# Patient Record
Sex: FEMALE | Race: WHITE | NOT HISPANIC OR LATINO | Employment: FULL TIME | URBAN - METROPOLITAN AREA
[De-identification: names, ages, dates, MRNs, and addresses within clinical notes are randomized per-mention and may not be internally consistent; named-entity substitution may affect disease eponyms.]

---

## 2020-11-30 ENCOUNTER — APPOINTMENT (OUTPATIENT)
Dept: CT IMAGING | Facility: HOSPITAL | Age: 20
End: 2020-11-30

## 2020-11-30 ENCOUNTER — APPOINTMENT (OUTPATIENT)
Dept: GENERAL RADIOLOGY | Facility: HOSPITAL | Age: 20
End: 2020-11-30

## 2020-11-30 ENCOUNTER — HOSPITAL ENCOUNTER (OUTPATIENT)
Facility: HOSPITAL | Age: 20
Setting detail: OBSERVATION
Discharge: HOME | End: 2020-12-04
Attending: EMERGENCY MEDICINE | Admitting: FAMILY MEDICINE

## 2020-11-30 ENCOUNTER — APPOINTMENT (OUTPATIENT)
Dept: ULTRASOUND IMAGING | Facility: HOSPITAL | Age: 20
End: 2020-11-30

## 2020-11-30 DIAGNOSIS — D72.825 BANDEMIA: ICD-10-CM

## 2020-11-30 DIAGNOSIS — R74.8 ELEVATED LIVER ENZYMES: ICD-10-CM

## 2020-11-30 DIAGNOSIS — B34.9 SYSTEMIC VIRAL ILLNESS: Primary | ICD-10-CM

## 2020-11-30 PROBLEM — R65.10 SIRS (SYSTEMIC INFLAMMATORY RESPONSE SYNDROME) (HCC): Status: ACTIVE | Noted: 2020-11-30

## 2020-11-30 PROBLEM — R74.01 TRANSAMINITIS: Status: ACTIVE | Noted: 2020-11-30

## 2020-11-30 LAB
ALBUMIN SERPL-MCNC: 4 G/DL (ref 3.5–5.2)
ALBUMIN/GLOB SERPL: 1.2 G/DL
ALP SERPL-CCNC: 233 U/L (ref 39–117)
ALT SERPL W P-5'-P-CCNC: 211 U/L (ref 1–33)
ANION GAP SERPL CALCULATED.3IONS-SCNC: 13 MMOL/L (ref 5–15)
AST SERPL-CCNC: 179 U/L (ref 1–32)
B PARAPERT DNA SPEC QL NAA+PROBE: NOT DETECTED
B PERT DNA SPEC QL NAA+PROBE: NOT DETECTED
B-HCG UR QL: NEGATIVE
BASOPHILS # BLD MANUAL: 0.05 10*3/MM3 (ref 0–0.2)
BASOPHILS NFR BLD AUTO: 1 % (ref 0–1.5)
BILIRUB SERPL-MCNC: 0.6 MG/DL (ref 0–1.2)
BILIRUB UR QL STRIP: NEGATIVE
BUN SERPL-MCNC: 8 MG/DL (ref 6–20)
BUN/CREAT SERPL: 11 (ref 7–25)
C PNEUM DNA NPH QL NAA+NON-PROBE: NOT DETECTED
CALCIUM SPEC-SCNC: 8.6 MG/DL (ref 8.6–10.5)
CHLORIDE SERPL-SCNC: 102 MMOL/L (ref 98–107)
CLARITY UR: CLEAR
CO2 SERPL-SCNC: 19 MMOL/L (ref 22–29)
COLOR UR: YELLOW
CREAT SERPL-MCNC: 0.73 MG/DL (ref 0.57–1)
CYTOLOGIST CVX/VAG CYTO: NORMAL
D-LACTATE SERPL-SCNC: 2 MMOL/L (ref 0.5–2)
D-LACTATE SERPL-SCNC: 2.4 MMOL/L (ref 0.5–2)
DEPRECATED RDW RBC AUTO: 38.6 FL (ref 37–54)
EOSINOPHIL # BLD MANUAL: 0 10*3/MM3 (ref 0–0.4)
EOSINOPHIL NFR BLD MANUAL: 0 % (ref 0.3–6.2)
ERYTHROCYTE [DISTWIDTH] IN BLOOD BY AUTOMATED COUNT: 12.7 % (ref 12.3–15.4)
FLUAV H1 2009 PAND RNA NPH QL NAA+PROBE: NOT DETECTED
FLUAV H1 HA GENE NPH QL NAA+PROBE: NOT DETECTED
FLUAV H3 RNA NPH QL NAA+PROBE: NOT DETECTED
FLUAV SUBTYP SPEC NAA+PROBE: NOT DETECTED
FLUBV RNA ISLT QL NAA+PROBE: NOT DETECTED
GFR SERPL CREATININE-BSD FRML MDRD: 100 ML/MIN/1.73
GLOBULIN UR ELPH-MCNC: 3.3 GM/DL
GLUCOSE SERPL-MCNC: 111 MG/DL (ref 65–99)
GLUCOSE UR STRIP-MCNC: NEGATIVE MG/DL
HADV DNA SPEC NAA+PROBE: NOT DETECTED
HAV IGM SERPL QL IA: NORMAL
HBV CORE IGM SERPL QL IA: NORMAL
HBV SURFACE AG SERPL QL IA: NORMAL
HCOV 229E RNA SPEC QL NAA+PROBE: NOT DETECTED
HCOV HKU1 RNA SPEC QL NAA+PROBE: NOT DETECTED
HCOV NL63 RNA SPEC QL NAA+PROBE: NOT DETECTED
HCOV OC43 RNA SPEC QL NAA+PROBE: NOT DETECTED
HCT VFR BLD AUTO: 35.9 % (ref 34–46.6)
HCV AB SER DONR QL: NORMAL
HETEROPH AB SER QL LA: NEGATIVE
HGB BLD-MCNC: 12 G/DL (ref 12–15.9)
HGB UR QL STRIP.AUTO: NEGATIVE
HIV1+2 AB SER QL: NORMAL
HMPV RNA NPH QL NAA+NON-PROBE: NOT DETECTED
HOLD SPECIMEN: NORMAL
HPIV1 RNA SPEC QL NAA+PROBE: NOT DETECTED
HPIV2 RNA SPEC QL NAA+PROBE: NOT DETECTED
HPIV3 RNA NPH QL NAA+PROBE: NOT DETECTED
HPIV4 P GENE NPH QL NAA+PROBE: NOT DETECTED
KETONES UR QL STRIP: ABNORMAL
LARGE PLATELETS: ABNORMAL
LEUKOCYTE ESTERASE UR QL STRIP.AUTO: NEGATIVE
LYMPHOCYTES # BLD MANUAL: 1.7 10*3/MM3 (ref 0.7–3.1)
LYMPHOCYTES NFR BLD MANUAL: 34 % (ref 19.6–45.3)
LYMPHOCYTES NFR BLD MANUAL: 4 % (ref 5–12)
M PNEUMO IGG SER IA-ACNC: NOT DETECTED
MCH RBC QN AUTO: 27.7 PG (ref 26.6–33)
MCHC RBC AUTO-ENTMCNC: 33.4 G/DL (ref 31.5–35.7)
MCV RBC AUTO: 82.9 FL (ref 79–97)
METAMYELOCYTES NFR BLD MANUAL: 1 % (ref 0–0)
MONOCYTES # BLD AUTO: 0.2 10*3/MM3 (ref 0.1–0.9)
NEUTROPHILS # BLD AUTO: 1.55 10*3/MM3 (ref 1.7–7)
NEUTROPHILS NFR BLD MANUAL: 13 % (ref 42.7–76)
NEUTS BAND NFR BLD MANUAL: 18 % (ref 0–5)
NITRITE UR QL STRIP: NEGATIVE
PATH INTERP BLD-IMP: NORMAL
PH UR STRIP.AUTO: 5.5 [PH] (ref 5–8)
PLATELET # BLD AUTO: 133 10*3/MM3 (ref 140–450)
PMV BLD AUTO: 11.8 FL (ref 6–12)
POLYCHROMASIA BLD QL SMEAR: ABNORMAL
POTASSIUM SERPL-SCNC: 3.4 MMOL/L (ref 3.5–5.2)
PROT SERPL-MCNC: 7.3 G/DL (ref 6–8.5)
PROT UR QL STRIP: NEGATIVE
RBC # BLD AUTO: 4.33 10*6/MM3 (ref 3.77–5.28)
RHINOVIRUS RNA SPEC NAA+PROBE: NOT DETECTED
RSV RNA NPH QL NAA+NON-PROBE: NOT DETECTED
SARS-COV-2 RNA NPH QL NAA+NON-PROBE: NOT DETECTED
SARS-COV-2 RNA RESP QL NAA+PROBE: NOT DETECTED
SODIUM SERPL-SCNC: 134 MMOL/L (ref 136–145)
SP GR UR STRIP: 1.01 (ref 1–1.03)
UROBILINOGEN UR QL STRIP: ABNORMAL
VARIANT LYMPHS NFR BLD MANUAL: 29 % (ref 0–5)
WBC # BLD AUTO: 5.01 10*3/MM3 (ref 3.4–10.8)
WBC MORPH BLD: NORMAL
WHOLE BLOOD HOLD SPECIMEN: NORMAL
WHOLE BLOOD HOLD SPECIMEN: NORMAL

## 2020-11-30 PROCEDURE — 85025 COMPLETE CBC W/AUTO DIFF WBC: CPT | Performed by: EMERGENCY MEDICINE

## 2020-11-30 PROCEDURE — 25010000002 PIPERACILLIN SOD-TAZOBACTAM PER 1 G: Performed by: INTERNAL MEDICINE

## 2020-11-30 PROCEDURE — 96375 TX/PRO/DX INJ NEW DRUG ADDON: CPT

## 2020-11-30 PROCEDURE — 86308 HETEROPHILE ANTIBODY SCREEN: CPT | Performed by: NURSE PRACTITIONER

## 2020-11-30 PROCEDURE — U0004 COV-19 TEST NON-CDC HGH THRU: HCPCS | Performed by: NURSE PRACTITIONER

## 2020-11-30 PROCEDURE — 81003 URINALYSIS AUTO W/O SCOPE: CPT | Performed by: NURSE PRACTITIONER

## 2020-11-30 PROCEDURE — 86644 CMV ANTIBODY: CPT | Performed by: INTERNAL MEDICINE

## 2020-11-30 PROCEDURE — 96365 THER/PROPH/DIAG IV INF INIT: CPT

## 2020-11-30 PROCEDURE — 99285 EMERGENCY DEPT VISIT HI MDM: CPT

## 2020-11-30 PROCEDURE — 83605 ASSAY OF LACTIC ACID: CPT | Performed by: INTERNAL MEDICINE

## 2020-11-30 PROCEDURE — 0202U NFCT DS 22 TRGT SARS-COV-2: CPT | Performed by: NURSE PRACTITIONER

## 2020-11-30 PROCEDURE — 87040 BLOOD CULTURE FOR BACTERIA: CPT | Performed by: EMERGENCY MEDICINE

## 2020-11-30 PROCEDURE — 80053 COMPREHEN METABOLIC PANEL: CPT | Performed by: EMERGENCY MEDICINE

## 2020-11-30 PROCEDURE — 86645 CMV ANTIBODY IGM: CPT | Performed by: INTERNAL MEDICINE

## 2020-11-30 PROCEDURE — 83605 ASSAY OF LACTIC ACID: CPT | Performed by: EMERGENCY MEDICINE

## 2020-11-30 PROCEDURE — G0378 HOSPITAL OBSERVATION PER HR: HCPCS

## 2020-11-30 PROCEDURE — 25010000002 IOPAMIDOL 61 % SOLUTION: Performed by: EMERGENCY MEDICINE

## 2020-11-30 PROCEDURE — 80074 ACUTE HEPATITIS PANEL: CPT | Performed by: NURSE PRACTITIONER

## 2020-11-30 PROCEDURE — 96361 HYDRATE IV INFUSION ADD-ON: CPT

## 2020-11-30 PROCEDURE — 99220 PR INITIAL OBSERVATION CARE/DAY 70 MINUTES: CPT | Performed by: INTERNAL MEDICINE

## 2020-11-30 PROCEDURE — 71045 X-RAY EXAM CHEST 1 VIEW: CPT

## 2020-11-30 PROCEDURE — G0432 EIA HIV-1/HIV-2 SCREEN: HCPCS | Performed by: INTERNAL MEDICINE

## 2020-11-30 PROCEDURE — 87040 BLOOD CULTURE FOR BACTERIA: CPT | Performed by: INTERNAL MEDICINE

## 2020-11-30 PROCEDURE — 74177 CT ABD & PELVIS W/CONTRAST: CPT

## 2020-11-30 PROCEDURE — 25010000002 KETOROLAC TROMETHAMINE PER 15 MG: Performed by: NURSE PRACTITIONER

## 2020-11-30 PROCEDURE — 96367 TX/PROPH/DG ADDL SEQ IV INF: CPT

## 2020-11-30 PROCEDURE — 76705 ECHO EXAM OF ABDOMEN: CPT

## 2020-11-30 PROCEDURE — 25010000002 HYDROMORPHONE PER 4 MG: Performed by: EMERGENCY MEDICINE

## 2020-11-30 PROCEDURE — 86665 EPSTEIN-BARR CAPSID VCA: CPT | Performed by: INTERNAL MEDICINE

## 2020-11-30 PROCEDURE — 96366 THER/PROPH/DIAG IV INF ADDON: CPT

## 2020-11-30 PROCEDURE — 87899 AGENT NOS ASSAY W/OPTIC: CPT | Performed by: INTERNAL MEDICINE

## 2020-11-30 PROCEDURE — 81025 URINE PREGNANCY TEST: CPT | Performed by: EMERGENCY MEDICINE

## 2020-11-30 PROCEDURE — 25010000002 ONDANSETRON PER 1 MG: Performed by: EMERGENCY MEDICINE

## 2020-11-30 PROCEDURE — 25010000002 VANCOMYCIN 10 G RECONSTITUTED SOLUTION: Performed by: INTERNAL MEDICINE

## 2020-11-30 PROCEDURE — C9803 HOPD COVID-19 SPEC COLLECT: HCPCS

## 2020-11-30 PROCEDURE — 85060 BLOOD SMEAR INTERPRETATION: CPT | Performed by: EMERGENCY MEDICINE

## 2020-11-30 PROCEDURE — 85007 BL SMEAR W/DIFF WBC COUNT: CPT | Performed by: EMERGENCY MEDICINE

## 2020-11-30 PROCEDURE — 25010000002 DIPHENHYDRAMINE PER 50 MG: Performed by: INTERNAL MEDICINE

## 2020-11-30 PROCEDURE — 86664 EPSTEIN-BARR NUCLEAR ANTIGEN: CPT | Performed by: INTERNAL MEDICINE

## 2020-11-30 RX ORDER — VANCOMYCIN HYDROCHLORIDE 1 G/200ML
1000 INJECTION, SOLUTION INTRAVENOUS EVERY 8 HOURS
Status: DISCONTINUED | OUTPATIENT
Start: 2020-12-01 | End: 2020-11-30

## 2020-11-30 RX ORDER — DIPHENHYDRAMINE HYDROCHLORIDE 50 MG/ML
50 INJECTION INTRAMUSCULAR; INTRAVENOUS ONCE
Status: COMPLETED | OUTPATIENT
Start: 2020-11-30 | End: 2020-11-30

## 2020-11-30 RX ORDER — VANCOMYCIN HYDROCHLORIDE 1 G/200ML
1000 INJECTION, SOLUTION INTRAVENOUS EVERY 8 HOURS
Status: DISCONTINUED | OUTPATIENT
Start: 2020-12-01 | End: 2020-12-01

## 2020-11-30 RX ORDER — FAMOTIDINE 10 MG/ML
20 INJECTION, SOLUTION INTRAVENOUS EVERY 12 HOURS SCHEDULED
Status: DISCONTINUED | OUTPATIENT
Start: 2020-11-30 | End: 2020-12-03

## 2020-11-30 RX ORDER — ONDANSETRON 2 MG/ML
4 INJECTION INTRAMUSCULAR; INTRAVENOUS ONCE
Status: COMPLETED | OUTPATIENT
Start: 2020-11-30 | End: 2020-11-30

## 2020-11-30 RX ORDER — IBUPROFEN 600 MG/1
600 TABLET ORAL ONCE
Status: COMPLETED | OUTPATIENT
Start: 2020-11-30 | End: 2020-11-30

## 2020-11-30 RX ORDER — ACETAMINOPHEN 500 MG
1000 TABLET ORAL ONCE
Status: COMPLETED | OUTPATIENT
Start: 2020-11-30 | End: 2020-11-30

## 2020-11-30 RX ORDER — KETOROLAC TROMETHAMINE 30 MG/ML
30 INJECTION, SOLUTION INTRAMUSCULAR; INTRAVENOUS ONCE
Status: COMPLETED | OUTPATIENT
Start: 2020-11-30 | End: 2020-11-30

## 2020-11-30 RX ORDER — HYDROMORPHONE HYDROCHLORIDE 1 MG/ML
0.5 INJECTION, SOLUTION INTRAMUSCULAR; INTRAVENOUS; SUBCUTANEOUS ONCE
Status: COMPLETED | OUTPATIENT
Start: 2020-11-30 | End: 2020-11-30

## 2020-11-30 RX ORDER — MULTIPLE VITAMINS W/ MINERALS TAB 9MG-400MCG
1 TAB ORAL DAILY
COMMUNITY

## 2020-11-30 RX ORDER — IBUPROFEN 400 MG/1
200 TABLET ORAL ONCE
Status: COMPLETED | OUTPATIENT
Start: 2020-11-30 | End: 2020-11-30

## 2020-11-30 RX ORDER — SODIUM CHLORIDE 0.9 % (FLUSH) 0.9 %
10 SYRINGE (ML) INJECTION AS NEEDED
Status: DISCONTINUED | OUTPATIENT
Start: 2020-11-30 | End: 2020-12-04 | Stop reason: HOSPADM

## 2020-11-30 RX ADMIN — SODIUM CHLORIDE, PRESERVATIVE FREE 10 ML: 5 INJECTION INTRAVENOUS at 19:19

## 2020-11-30 RX ADMIN — IBUPROFEN 600 MG: 600 TABLET, FILM COATED ORAL at 12:47

## 2020-11-30 RX ADMIN — ONDANSETRON 4 MG: 2 INJECTION INTRAMUSCULAR; INTRAVENOUS at 11:38

## 2020-11-30 RX ADMIN — SODIUM CHLORIDE 1000 ML: 9 INJECTION, SOLUTION INTRAVENOUS at 11:37

## 2020-11-30 RX ADMIN — DIPHENHYDRAMINE HYDROCHLORIDE 50 MG: 50 INJECTION INTRAMUSCULAR; INTRAVENOUS at 23:50

## 2020-11-30 RX ADMIN — VANCOMYCIN HYDROCHLORIDE 2250 MG: 100 INJECTION, POWDER, LYOPHILIZED, FOR SOLUTION INTRAVENOUS at 21:30

## 2020-11-30 RX ADMIN — IOPAMIDOL 80 ML: 612 INJECTION, SOLUTION INTRAVENOUS at 17:58

## 2020-11-30 RX ADMIN — SODIUM CHLORIDE, PRESERVATIVE FREE 10 ML: 5 INJECTION INTRAVENOUS at 11:30

## 2020-11-30 RX ADMIN — SODIUM CHLORIDE 1000 ML: 9 INJECTION, SOLUTION INTRAVENOUS at 16:37

## 2020-11-30 RX ADMIN — HYDROMORPHONE HYDROCHLORIDE 0.5 MG: 1 INJECTION, SOLUTION INTRAMUSCULAR; INTRAVENOUS; SUBCUTANEOUS at 12:51

## 2020-11-30 RX ADMIN — FAMOTIDINE 20 MG: 10 INJECTION INTRAVENOUS at 23:52

## 2020-11-30 RX ADMIN — ACETAMINOPHEN 1000 MG: 500 TABLET, FILM COATED ORAL at 19:18

## 2020-11-30 RX ADMIN — IBUPROFEN 200 MG: 400 TABLET ORAL at 23:54

## 2020-11-30 RX ADMIN — KETOROLAC TROMETHAMINE 30 MG: 30 INJECTION, SOLUTION INTRAMUSCULAR at 19:18

## 2020-11-30 RX ADMIN — TAZOBACTAM SODIUM AND PIPERACILLIN SODIUM 3.38 G: 375; 3 INJECTION, SOLUTION INTRAVENOUS at 20:30

## 2020-11-30 RX ADMIN — SODIUM CHLORIDE 1000 ML: 9 INJECTION, SOLUTION INTRAVENOUS at 12:47

## 2020-12-01 LAB
ANION GAP SERPL CALCULATED.3IONS-SCNC: 12 MMOL/L (ref 5–15)
BASOPHILS # BLD MANUAL: 0 10*3/MM3 (ref 0–0.2)
BASOPHILS NFR BLD AUTO: 0 % (ref 0–1.5)
BUN SERPL-MCNC: 5 MG/DL (ref 6–20)
BUN/CREAT SERPL: 7.2 (ref 7–25)
CALCIUM SPEC-SCNC: 7.9 MG/DL (ref 8.6–10.5)
CHLORIDE SERPL-SCNC: 105 MMOL/L (ref 98–107)
CO2 SERPL-SCNC: 20 MMOL/L (ref 22–29)
CREAT SERPL-MCNC: 0.69 MG/DL (ref 0.57–1)
D-LACTATE SERPL-SCNC: 1.9 MMOL/L (ref 0.5–2)
DEPRECATED RDW RBC AUTO: 39.5 FL (ref 37–54)
EOSINOPHIL # BLD MANUAL: 0.04 10*3/MM3 (ref 0–0.4)
EOSINOPHIL NFR BLD MANUAL: 1 % (ref 0.3–6.2)
ERYTHROCYTE [DISTWIDTH] IN BLOOD BY AUTOMATED COUNT: 12.8 % (ref 12.3–15.4)
GFR SERPL CREATININE-BSD FRML MDRD: 106 ML/MIN/1.73
GLUCOSE SERPL-MCNC: 112 MG/DL (ref 65–99)
HCT VFR BLD AUTO: 30.7 % (ref 34–46.6)
HGB BLD-MCNC: 10.1 G/DL (ref 12–15.9)
L PNEUMO1 AG UR QL IA: NEGATIVE
LACTATE HOLD SPECIMEN: NORMAL
LARGE PLATELETS: ABNORMAL
LYMPHOCYTES # BLD MANUAL: 0.81 10*3/MM3 (ref 0.7–3.1)
LYMPHOCYTES NFR BLD MANUAL: 22 % (ref 19.6–45.3)
LYMPHOCYTES NFR BLD MANUAL: 7 % (ref 5–12)
MAGNESIUM SERPL-MCNC: 1.6 MG/DL (ref 1.6–2.6)
MCH RBC QN AUTO: 27.7 PG (ref 26.6–33)
MCHC RBC AUTO-ENTMCNC: 32.9 G/DL (ref 31.5–35.7)
MCV RBC AUTO: 84.3 FL (ref 79–97)
MONOCYTES # BLD AUTO: 0.26 10*3/MM3 (ref 0.1–0.9)
NEUTROPHILS # BLD AUTO: 1.39 10*3/MM3 (ref 1.7–7)
NEUTROPHILS NFR BLD MANUAL: 12 % (ref 42.7–76)
NEUTS BAND NFR BLD MANUAL: 26 % (ref 0–5)
PLATELET # BLD AUTO: 114 10*3/MM3 (ref 140–450)
PMV BLD AUTO: 12.4 FL (ref 6–12)
POTASSIUM SERPL-SCNC: 3.1 MMOL/L (ref 3.5–5.2)
RBC # BLD AUTO: 3.64 10*6/MM3 (ref 3.77–5.28)
RBC MORPH BLD: NORMAL
SMUDGE CELLS BLD QL SMEAR: ABNORMAL
SODIUM SERPL-SCNC: 137 MMOL/L (ref 136–145)
VARIANT LYMPHS NFR BLD MANUAL: 32 % (ref 0–5)
WBC # BLD AUTO: 3.66 10*3/MM3 (ref 3.4–10.8)

## 2020-12-01 PROCEDURE — 96376 TX/PRO/DX INJ SAME DRUG ADON: CPT

## 2020-12-01 PROCEDURE — G0378 HOSPITAL OBSERVATION PER HR: HCPCS

## 2020-12-01 PROCEDURE — 99225 PR SBSQ OBSERVATION CARE/DAY 25 MINUTES: CPT | Performed by: INTERNAL MEDICINE

## 2020-12-01 PROCEDURE — 83735 ASSAY OF MAGNESIUM: CPT | Performed by: INTERNAL MEDICINE

## 2020-12-01 PROCEDURE — 80048 BASIC METABOLIC PNL TOTAL CA: CPT | Performed by: INTERNAL MEDICINE

## 2020-12-01 PROCEDURE — 96367 TX/PROPH/DG ADDL SEQ IV INF: CPT

## 2020-12-01 PROCEDURE — 25010000002 ONDANSETRON PER 1 MG: Performed by: INTERNAL MEDICINE

## 2020-12-01 PROCEDURE — 83605 ASSAY OF LACTIC ACID: CPT | Performed by: INTERNAL MEDICINE

## 2020-12-01 PROCEDURE — 96366 THER/PROPH/DIAG IV INF ADDON: CPT

## 2020-12-01 PROCEDURE — 96368 THER/DIAG CONCURRENT INF: CPT

## 2020-12-01 PROCEDURE — 86665 EPSTEIN-BARR CAPSID VCA: CPT | Performed by: INTERNAL MEDICINE

## 2020-12-01 PROCEDURE — 85007 BL SMEAR W/DIFF WBC COUNT: CPT | Performed by: INTERNAL MEDICINE

## 2020-12-01 PROCEDURE — 96361 HYDRATE IV INFUSION ADD-ON: CPT

## 2020-12-01 PROCEDURE — 85025 COMPLETE CBC W/AUTO DIFF WBC: CPT | Performed by: INTERNAL MEDICINE

## 2020-12-01 PROCEDURE — 25010000003 MAGNESIUM SULFATE 4 GM/100ML SOLUTION: Performed by: INTERNAL MEDICINE

## 2020-12-01 PROCEDURE — 25010000002 PIPERACILLIN SOD-TAZOBACTAM PER 1 G: Performed by: INTERNAL MEDICINE

## 2020-12-01 PROCEDURE — 25010000002 VANCOMYCIN PER 500 MG

## 2020-12-01 RX ORDER — ACETAMINOPHEN 160 MG/5ML
650 SOLUTION ORAL EVERY 4 HOURS PRN
Status: DISCONTINUED | OUTPATIENT
Start: 2020-12-01 | End: 2020-12-04 | Stop reason: HOSPADM

## 2020-12-01 RX ORDER — MAGNESIUM SULFATE HEPTAHYDRATE 40 MG/ML
2 INJECTION, SOLUTION INTRAVENOUS AS NEEDED
Status: DISCONTINUED | OUTPATIENT
Start: 2020-12-01 | End: 2020-12-04 | Stop reason: HOSPADM

## 2020-12-01 RX ORDER — POTASSIUM CHLORIDE 750 MG/1
20 CAPSULE, EXTENDED RELEASE ORAL ONCE
Status: COMPLETED | OUTPATIENT
Start: 2020-12-01 | End: 2020-12-01

## 2020-12-01 RX ORDER — POTASSIUM CHLORIDE 7.45 MG/ML
10 INJECTION INTRAVENOUS
Status: DISCONTINUED | OUTPATIENT
Start: 2020-12-01 | End: 2020-12-04 | Stop reason: HOSPADM

## 2020-12-01 RX ORDER — IBUPROFEN 600 MG/1
600 TABLET ORAL EVERY 4 HOURS PRN
Status: DISCONTINUED | OUTPATIENT
Start: 2020-12-01 | End: 2020-12-04 | Stop reason: HOSPADM

## 2020-12-01 RX ORDER — MAGNESIUM SULFATE HEPTAHYDRATE 40 MG/ML
4 INJECTION, SOLUTION INTRAVENOUS AS NEEDED
Status: DISCONTINUED | OUTPATIENT
Start: 2020-12-01 | End: 2020-12-04 | Stop reason: HOSPADM

## 2020-12-01 RX ORDER — BENZONATATE 100 MG/1
100 CAPSULE ORAL 3 TIMES DAILY PRN
Status: DISCONTINUED | OUTPATIENT
Start: 2020-12-01 | End: 2020-12-04 | Stop reason: HOSPADM

## 2020-12-01 RX ORDER — ACETAMINOPHEN 325 MG/1
650 TABLET ORAL EVERY 4 HOURS PRN
Status: DISCONTINUED | OUTPATIENT
Start: 2020-12-01 | End: 2020-12-04 | Stop reason: HOSPADM

## 2020-12-01 RX ORDER — SODIUM CHLORIDE 0.9 % (FLUSH) 0.9 %
10 SYRINGE (ML) INJECTION EVERY 12 HOURS SCHEDULED
Status: DISCONTINUED | OUTPATIENT
Start: 2020-12-01 | End: 2020-12-04 | Stop reason: HOSPADM

## 2020-12-01 RX ORDER — ACETAMINOPHEN 650 MG/1
650 SUPPOSITORY RECTAL EVERY 4 HOURS PRN
Status: DISCONTINUED | OUTPATIENT
Start: 2020-12-01 | End: 2020-12-04 | Stop reason: HOSPADM

## 2020-12-01 RX ORDER — POTASSIUM CHLORIDE 750 MG/1
40 CAPSULE, EXTENDED RELEASE ORAL AS NEEDED
Status: DISCONTINUED | OUTPATIENT
Start: 2020-12-01 | End: 2020-12-04 | Stop reason: HOSPADM

## 2020-12-01 RX ORDER — POTASSIUM CHLORIDE 1.5 G/1.77G
40 POWDER, FOR SOLUTION ORAL AS NEEDED
Status: DISCONTINUED | OUTPATIENT
Start: 2020-12-01 | End: 2020-12-04 | Stop reason: HOSPADM

## 2020-12-01 RX ORDER — SODIUM CHLORIDE, SODIUM LACTATE, POTASSIUM CHLORIDE, CALCIUM CHLORIDE 600; 310; 30; 20 MG/100ML; MG/100ML; MG/100ML; MG/100ML
100 INJECTION, SOLUTION INTRAVENOUS CONTINUOUS
Status: DISCONTINUED | OUTPATIENT
Start: 2020-12-01 | End: 2020-12-01

## 2020-12-01 RX ORDER — SODIUM CHLORIDE, SODIUM LACTATE, POTASSIUM CHLORIDE, CALCIUM CHLORIDE 600; 310; 30; 20 MG/100ML; MG/100ML; MG/100ML; MG/100ML
100 INJECTION, SOLUTION INTRAVENOUS CONTINUOUS
Status: ACTIVE | OUTPATIENT
Start: 2020-12-01 | End: 2020-12-01

## 2020-12-01 RX ORDER — SODIUM CHLORIDE 0.9 % (FLUSH) 0.9 %
10 SYRINGE (ML) INJECTION AS NEEDED
Status: DISCONTINUED | OUTPATIENT
Start: 2020-12-01 | End: 2020-12-04 | Stop reason: HOSPADM

## 2020-12-01 RX ORDER — ONDANSETRON 2 MG/ML
4 INJECTION INTRAMUSCULAR; INTRAVENOUS EVERY 6 HOURS PRN
Status: DISCONTINUED | OUTPATIENT
Start: 2020-12-01 | End: 2020-12-04 | Stop reason: HOSPADM

## 2020-12-01 RX ADMIN — ONDANSETRON 4 MG: 2 INJECTION INTRAMUSCULAR; INTRAVENOUS at 07:49

## 2020-12-01 RX ADMIN — SODIUM CHLORIDE, PRESERVATIVE FREE 10 ML: 5 INJECTION INTRAVENOUS at 20:49

## 2020-12-01 RX ADMIN — SODIUM CHLORIDE, PRESERVATIVE FREE 10 ML: 5 INJECTION INTRAVENOUS at 11:11

## 2020-12-01 RX ADMIN — IBUPROFEN 600 MG: 600 TABLET, FILM COATED ORAL at 22:43

## 2020-12-01 RX ADMIN — POTASSIUM CHLORIDE 40 MEQ: 10 CAPSULE, COATED, EXTENDED RELEASE ORAL at 14:16

## 2020-12-01 RX ADMIN — FAMOTIDINE 20 MG: 10 INJECTION INTRAVENOUS at 07:49

## 2020-12-01 RX ADMIN — BENZONATATE 100 MG: 100 CAPSULE ORAL at 21:10

## 2020-12-01 RX ADMIN — SODIUM CHLORIDE, POTASSIUM CHLORIDE, SODIUM LACTATE AND CALCIUM CHLORIDE 100 ML/HR: 600; 310; 30; 20 INJECTION, SOLUTION INTRAVENOUS at 11:18

## 2020-12-01 RX ADMIN — SODIUM CHLORIDE, POTASSIUM CHLORIDE, SODIUM LACTATE AND CALCIUM CHLORIDE 100 ML/HR: 600; 310; 30; 20 INJECTION, SOLUTION INTRAVENOUS at 01:59

## 2020-12-01 RX ADMIN — SODIUM CHLORIDE, POTASSIUM CHLORIDE, SODIUM LACTATE AND CALCIUM CHLORIDE 100 ML/HR: 600; 310; 30; 20 INJECTION, SOLUTION INTRAVENOUS at 19:43

## 2020-12-01 RX ADMIN — MAGNESIUM SULFATE HEPTAHYDRATE 4 G: 40 INJECTION, SOLUTION INTRAVENOUS at 11:17

## 2020-12-01 RX ADMIN — IBUPROFEN 600 MG: 600 TABLET, FILM COATED ORAL at 18:00

## 2020-12-01 RX ADMIN — ONDANSETRON 4 MG: 2 INJECTION INTRAMUSCULAR; INTRAVENOUS at 19:33

## 2020-12-01 RX ADMIN — ONDANSETRON 4 MG: 2 INJECTION INTRAMUSCULAR; INTRAVENOUS at 01:01

## 2020-12-01 RX ADMIN — POTASSIUM CHLORIDE 40 MEQ: 10 CAPSULE, COATED, EXTENDED RELEASE ORAL at 11:09

## 2020-12-01 RX ADMIN — ACETAMINOPHEN 650 MG: 325 TABLET, FILM COATED ORAL at 01:45

## 2020-12-01 RX ADMIN — POTASSIUM CHLORIDE 20 MEQ: 10 CAPSULE, COATED, EXTENDED RELEASE ORAL at 01:45

## 2020-12-01 RX ADMIN — ACETAMINOPHEN 650 MG: 325 TABLET, FILM COATED ORAL at 08:06

## 2020-12-01 RX ADMIN — SODIUM CHLORIDE, PRESERVATIVE FREE 10 ML: 5 INJECTION INTRAVENOUS at 07:49

## 2020-12-01 RX ADMIN — ACETAMINOPHEN 650 MG: 325 TABLET, FILM COATED ORAL at 19:41

## 2020-12-01 RX ADMIN — SODIUM CHLORIDE, PRESERVATIVE FREE 10 ML: 5 INJECTION INTRAVENOUS at 01:59

## 2020-12-01 RX ADMIN — FAMOTIDINE 20 MG: 10 INJECTION INTRAVENOUS at 20:49

## 2020-12-01 RX ADMIN — TAZOBACTAM SODIUM AND PIPERACILLIN SODIUM 3.38 G: 375; 3 INJECTION, SOLUTION INTRAVENOUS at 04:02

## 2020-12-01 RX ADMIN — ACETAMINOPHEN 650 MG: 325 TABLET, FILM COATED ORAL at 14:16

## 2020-12-01 RX ADMIN — IBUPROFEN 600 MG: 600 TABLET, FILM COATED ORAL at 11:09

## 2020-12-01 RX ADMIN — TAZOBACTAM SODIUM AND PIPERACILLIN SODIUM 3.38 G: 375; 3 INJECTION, SOLUTION INTRAVENOUS at 12:50

## 2020-12-01 RX ADMIN — VANCOMYCIN HYDROCHLORIDE 1000 MG: 1 INJECTION, SOLUTION INTRAVENOUS at 05:14

## 2020-12-01 RX ADMIN — TAZOBACTAM SODIUM AND PIPERACILLIN SODIUM 3.38 G: 375; 3 INJECTION, SOLUTION INTRAVENOUS at 20:49

## 2020-12-01 RX ADMIN — POTASSIUM CHLORIDE 40 MEQ: 10 CAPSULE, COATED, EXTENDED RELEASE ORAL at 17:51

## 2020-12-01 NOTE — PLAN OF CARE
Goal Outcome Evaluation:  Plan of Care Reviewed With: patient  Progress: improving  Outcome Summary: VSS with the exeption of fever of 103 this morning. Magnesium and Potassium are being replaced. Last dose of potassium will be given before end of shift. Pt still on room air and satting well. Will cont to monitor.

## 2020-12-01 NOTE — PROGRESS NOTES
Discharge Planning Assessment  [facility]     Patient Name: Whitney North  MRN: [id number]  Today's Date: 12/1/2020    Admit Date: 11/30/2020    Discharge Needs Assessment     Row Name 12/01/20 1005       Living Environment    Current Living Arrangements  home/apartment/condo    Primary Care Provided by  self    Provides Primary Care For  no one    Family Caregiver if Needed  parent(s)    Quality of Family Relationships  helpful;involved    Able to Return to Prior Arrangements  yes       Resource/Environmental Concerns    Resource/Environmental Concerns  none    Transportation Concerns  car, none       Transition Planning    Patient/Family Anticipates Transition to  home;home with family    Patient/Family Anticipated Services at Transition  none    Transportation Anticipated  family or friend will provide       Discharge Needs Assessment    Readmission Within the Last 30 Days  no previous admission in last 30 days    Equipment Currently Used at Home  none    Concerns to be Addressed  discharge planning    Anticipated Changes Related to Illness  none    Equipment Needed After Discharge  none        Discharge Plan     Row Name 12/01/20 1006       Plan    Plan  Initial CM eval    Patient/Family in Agreement with Plan  yes    Plan Comments  Talked with patient over the phone - she is independent of ADL's and denies the use of any DME at home. Her goal is to return home upon discharge and has her mother's assistance if needed. CM will follow for any possible discharge needs that may arise - lesly [phone]    Final Discharge Disposition Code  01 - home or self-care        Continued Care and Services - Admitted Since 11/30/2020    Coordination has not been started for this encounter.         Demographic Summary     Row Name 12/01/20 1005       General Information    Admission Type  inpatient    Arrived From  home    Referral Source  admission list    Reason for Consult  discharge planning    Preferred Language  English      Used During This Interaction  no       Contact Information    Permission Granted to Share Info With          Functional Status     Row Name 12/01/20 1005       Functional Status    Usual Activity Tolerance  good    Current Activity Tolerance  moderate       Functional Status, IADL    Medications  independent    Meal Preparation  independent    Housekeeping  independent    Laundry  independent    Shopping  independent       Mental Status    General Appearance WDL  WDL        Psychosocial    No documentation.       Abuse/Neglect    No documentation.       Legal    No documentation.       Substance Abuse    No documentation.       Patient Forms    No documentation.           Mimi Doll RN

## 2020-12-01 NOTE — H&P
[facility] Medicine Services  HISTORY AND PHYSICAL    Patient Name: Whitney North  : 2000  MRN: [id number]  Primary Care Physician: Vik Sandoval MD  Date of admission: 2020      Subjective   Subjective     Chief Complaint:  Fever, chills, myalgias    HPI:  Whitney North is a 20 y.o. female with no significant past medical history who presents to the ER with complaints of fever, chills, myalgias.    Patient reports that she had Covid in August of this year.  She recovered well from this and has had no issues.  Patient reports that starting 7 days ago she began to feel ill with flulike symptoms.  She reports persistent fever, chills, myalgias.  Over the last 2 to 3 days she has developed increasing nausea with vomiting.  Some mild left upper quadrant pain.  She also reports that she has had mild congested cough over the last 2 days with scant mucus production.  She reports some nasal congestion.  Some mild shortness of breath.  Denies any loss of taste or smell.  She works as an x-ray tech at [email or web address] and therefore is exposed to Covid regularly, but wears proper PPE..    Patient reports her last sexual contact was in May of this year.  She denies any current significant other or intimate relations since that time.  She denies prior history of mononucleosis.  She denies any exposure any sick contacts.  Denies any risky sexual behavior.  Denies any IV drug use.        Current COVID Risks are:  [x] Fever [x]  Cough [x] Shortness of breath [x] Fatigue [] Change in taste or smell    [x] Exposure to COVID positive patient  [] High risk facility   []  NONE    Review of Systems   Gen-reports fevers, chills  CV- No chest pain, palpitations  Resp-ports cough, dyspnea  GI-reports N/V and abdominal pain, denies diarrhea      All other systems reviewed and are negative.     Personal History     Past Medical History:   Diagnosis Date   • Acne    • Dysmenorrhea    • Irregular menses         Past Surgical History:   Procedure Laterality Date   • NO PAST SURGERIES         Family History: family history includes Breast cancer in an other family member; Heart disease in her maternal grandmother; Hypertension in her maternal grandmother; Osteoporosis in her paternal grandmother. Otherwise pertinent FHx was reviewed and unremarkable.     Social History:  reports that she has never smoked. She has never used smokeless tobacco. She reports that she does not drink alcohol or use drugs.  Social History     Social History Narrative   • Not on file       Medications:  Available home medication information reviewed.  (Not in a hospital admission)      No Known Allergies    Objective   Objective     Vital Signs:   Temp:  [99.5 °F (37.5 °C)-103.1 °F (39.5 °C)] 101.1 °F (38.4 °C)  Heart Rate:  [] 124  Resp:  [16-18] 18  BP: (128-154)/() 148/88        Physical Exam   Constitutional: Awake, alert, appears acutely ill perspiring  Eyes: PERRLA, sclerae anicteric, no conjunctival injection  HENT: NCAT, mucous membranes moist  Neck: Supple, no thyromegaly, no lymphadenopathy, trachea midline  Respiratory: Clear to auscultation bilaterally, moderately labored respirations   Cardiovascular: Tachycardic, regular, no murmurs, rubs, or gallops, palpable pedal pulses bilaterally  Gastrointestinal: Positive bowel sounds, soft, nontender, nondistended  Musculoskeletal: No bilateral ankle edema, no clubbing or cyanosis to extremities  Psychiatric: Appropriate affect, cooperative  Neurologic: Oriented x 3, strength symmetric in all extremities, Cranial Nerves grossly intact to confrontation, speech clear  Skin: No rashes      Results Reviewed:  I have personally reviewed most recent indicated data and agree with findings including:  [x]  Laboratory  [x]  Radiology  [x]  EKG/Telemetry  []  Pathology  []  Cardiac/Vascular Studies  []  Old records  []  Other:  Most pertinent findings include: Bandemia, mildly low  platelets at 133, atypical lymphocytes, mildly elevated liver enzymes and alkaline phosphatase.      LAB RESULTS:  Results from last 7 days   Lab 11/30/20  1130   WBC 5.01   HEMOGLOBIN 12.0   HEMATOCRIT 35.9   PLATELETS 133*   NEUTROS ABS 1.55*   EOS ABS 0.00   MCV 82.9   LACTATE 2.0     Results from last 7 days   Lab 11/30/20  1130   SODIUM 134*   POTASSIUM 3.4*   CHLORIDE 102   CO2 19.0*   ANION GAP 13.0   BUN 8   CREATININE 0.73   GLUCOSE 111*   CALCIUM 8.6     Results from last 7 days   Lab 11/30/20  1130   TOTAL PROTEIN 7.3   ALBUMIN 4.00   GLOBULIN 3.3   ALT (SGPT) 211*   AST (SGOT) 179*   BILIRUBIN 0.6   ALK PHOS 233*                     Brief Urine Lab Results  (Last result in the past 365 days)      Color   Clarity   Blood   Leuk Est   Nitrite   Protein   CREAT   Urine HCG        11/30/20 1637               Negative     11/30/20 1637 Yellow Clear Negative Negative Negative Negative             Microbiology Results (last 10 days)     Procedure Component Value - Date/Time    Respiratory Panel PCR w/COVID-19(SARS-CoV-2) [facility] In-House, NP Swab in UTM/VTM, 3-4 HR TAT - Swab, Nasopharynx [[id number]]  (Normal) Collected: 11/30/20 1509    Lab Status: Final result Specimen: Swab from Nasopharynx Updated: 11/30/20 1623     ADENOVIRUS, PCR Not Detected     Coronavirus 229E Not Detected     Coronavirus HKU1 Not Detected     Coronavirus NL63 Not Detected     Coronavirus OC43 Not Detected     COVID19 Not Detected     Human Metapneumovirus Not Detected     Human Rhinovirus/Enterovirus Not Detected     Influenza A PCR Not Detected     Influenza A H1 Not Detected     Influenza A H1 2009 PCR Not Detected     Influenza A H3 Not Detected     Influenza B PCR Not Detected     Parainfluenza Virus 1 Not Detected     Parainfluenza Virus 2 Not Detected     Parainfluenza Virus 3 Not Detected     Parainfluenza Virus 4 Not Detected     RSV, PCR Not Detected     Bordetella pertussis pcr Not Detected     Bordetella  parapertussis PCR Not Detected     Chlamydophila pneumoniae PCR Not Detected     Mycoplasma pneumo by PCR Not Detected    Narrative:      Fact sheet for providers: https://docs.[employer]/wp-content/uploads/DMK0089-4650-TN3.1-EUA-Provider-Fact-Sheet-3.pdf    Fact sheet for patients: https://docs.[employer]/wp-content/uploads/WHV0451-5677-IP2.1-EUA-Patient-Fact-Sheet-1.pdf    COVID-19 PCR, [facility], NP SWAB IN LEXAR VIRAL TRANSPORT MEDIA 24-30 HR TAT - Swab, Nasopharynx [[id number]] Collected: 11/30/20 1251    Lab Status: Final result Specimen: Swab from Nasopharynx Updated: 11/30/20 1714     SARS-CoV-2 VICTORIA Not Detected        Imaging Results (Last 24 Hours)     Procedure Component Value Units Date/Time    CT Abdomen Pelvis With Contrast [[id number]] Collected: 11/30/20 1820     Updated: 11/30/20 1828    Narrative:      EXAMINATION: CT ABDOMEN PELVIS W CONTRAST-      INDICATION: LUQ abd pain; fever; covid negative; vomiting today      TECHNIQUE: CT abdomen pelvis with intravenous contrast administration     The radiation dose reduction device was turned on for each scan per the  ALARA (As Low as Reasonably Achievable) protocol.     COMPARISON: NONE     FINDINGS: Lung bases demonstrate atelectasis and/or scarring without  consolidation or effusion. Liver demonstrates homogeneity throughout  without focal liver lesion. Gallbladder unremarkable. No biliary  dilatation. Pancreas unremarkable. Spleen is enlarged to 14.5 cm of  indeterminate significance. Adrenals without distinct nodule. Kidneys  without hydronephrosis or hydroureter. No bulky retroperitoneal  adenopathy. Patent nonaneurysmal abdominal aorta. Celiac and SMA origins  patent. GI tract evaluation without focal thickening or disproportionate  dilatation of bowel to suggest mechanical obstructive process. Appendix  visualized and unremarkable. No free fluid or intra-abdominal free air.  Pelvic viscera demonstrates findings of likely current menstruation  with  trace physiologically appropriate free fluid. No suspicious adnexal  lesion or loculated collection. No aggressive osseous or soft tissue  body wall findings.             Impression:      No mechanical obstructive process or loculated fluid  collection with appendix visualized and unremarkable. Redundancy of the  sigmoid colon without evidence for acute inflammation to suggest colitis  or diverticulitis. Current menstruation findings with physiologically  appropriate free fluid in the pelvic cul-de-sac                   Assessment/Plan   Assessment & Plan     Active Hospital Problems    Diagnosis POA   • Systemic viral illness [B34.9] Yes       SIRS. Bandemia  -- given aytpical lymphocytes favor viral illness. Although pt also with 18% bands and therefore must initially cover with broad spectrum abx at least until other infectious sources ruled out.  --blood cx      Fever, favor Viral illness  --Covid swab negative  --keep in precautions for now  --ID to clear patient  --favor mono as etiology given atypical lymphocytes and splenomegaly, rapid mono negative  --check EBV/CMV profile  --HIV  --hep panel negative  --Alternate Tylenol and ibuprofen  --ID consult    Elevated liver enzymes  --US gallbladder given increased n/v  --Hep panel negative    Atypical lympohcytes  --likely viral process. If unresolving, consider hematology evaluation.      DVT prophylaxis:  lovenox      CODE STATUS:  full  Code Status and Medical Interventions:   Ordered at: 11/30/20 7514     Level Of Support Discussed With:    Patient     Code Status:    CPR     Medical Interventions (Level of Support Prior to Arrest):    Full       Admission Status:  I believe this patient meets OBSERVATION status, however if further evaluation or treatment plans warrant, status may change.  Based upon current information, I predict patient's care encounter to be less than or equal to 2 midnights.        Miguel Chaudhari, DO  11/30/20

## 2020-12-01 NOTE — CONSULTS
"INFECTIOUS DISEASE CONSULT/INITIAL HOSPITAL VISIT    hWitney North  2000  [id number]    Date of Consult: 12/1/2020    Admission Date: 11/30/2020      Requesting Provider: No ref. provider found  Evaluating Physician: Alexei Woodson MD    Reason for Consultation: Fevers    History of present illness:    Patient is a 20 y.o. female presents the emergency room with fevers, chills, myalgias.  Patient allegedly had Covid in August 2020.  Patient been feeling ill for the last 7 days and reports flulike symptoms, fevers, chills, myalgias.  For the last several days developed nausea and vomiting.  Patient midst to right upper quadrant abdominal pain.    Patient has mild cough, shortness of breath, nasal congestion.  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  Patient wears PPE but is around patients with Covid regularly.    Felt ill 1 week ago.  Denies sore throat, admits to swollen lymph nodes in sides of neck;    Has left sided abdominal pain.  Says that when she had Covid in August she had anosmia she denies anosmia currently.    She feels worsened in August    When she has fever she feels profoundly weak and debilitated.      Patient has 2 roommates who left for Thanksgiving break; denies that either of them had any illnesses; denies eating or drinking after them      Last menstrual cycle is ongoing started last Thursday;     No outdoor activities;    No tick bites;         Denies recent sexual activity.    Denies systemic rash did have \"red man\" syndrome with vancomycin yesterday        Past Medical History:   Diagnosis Date   • Acne    • Dysmenorrhea 2016   • Irregular menses 2016       Past Surgical History:   Procedure Laterality Date   • NO PAST SURGERIES         Family History   Problem Relation Age of Onset   • Osteoporosis Paternal Grandmother    • Hypertension Maternal Grandmother    • Heart disease Maternal Grandmother    • Breast cancer Other        Social History "     Socioeconomic History   • Marital status: Single     Spouse name: Not on file   • Number of children: 0   • Years of education: Not on file   • Highest education level: Not on file   Occupational History   • Occupation: STUDENT     Comment: [employer]   Tobacco Use   • Smoking status: Never Smoker   • Smokeless tobacco: Never Used   Substance and Sexual Activity   • Alcohol use: No   • Drug use: No   • Sexual activity: Not Currently     Partners: Male     Comment: LAST SEXUAL CONTACT IN MAY       No Known Allergies      Medication:    Current Facility-Administered Medications:   •  ! Vanc trough due 21:30 pm 12/1 - hold 10 pm vanc dose until trough reviewed by Pharmacist, , Does not apply, Once, Miguel Chaudhari, DO  •  acetaminophen (TYLENOL) tablet 650 mg, 650 mg, Oral, Q4H PRN, 650 mg at 12/01/20 0806 **OR** acetaminophen (TYLENOL) 160 MG/5ML solution 650 mg, 650 mg, Oral, Q4H PRN **OR** acetaminophen (TYLENOL) suppository 650 mg, 650 mg, Rectal, Q4H PRN, Miguel Chaudhari, DO  •  enoxaparin (LOVENOX) syringe 40 mg, 40 mg, Subcutaneous, Q24H, Miguel Chauhdari, DO  •  famotidine (PEPCID) injection 20 mg, 20 mg, Intravenous, Q12H, Miguel Chaudhari, DO, 20 mg at 12/01/20 0749  •  ibuprofen (ADVIL,MOTRIN) tablet 600 mg, 600 mg, Oral, Q4H PRN, Miguel Chaudhari, DO  •  lactated ringers infusion, 100 mL/hr, Intravenous, Continuous, Miguel Chaudhari, , Last Rate: 100 mL/hr at 12/01/20 0613, 100 mL/hr at 12/01/20 0613  •  ondansetron (ZOFRAN) injection 4 mg, 4 mg, Intravenous, Q6H PRN, Miguel Chaudhari, DO, 4 mg at 12/01/20 0749  •  Pharmacy to dose vancomycin, , Does not apply, Continuous PRN, Miguel Chaudhari, DO  •  piperacillin-tazobactam (ZOSYN) 3.375 g in iso-osmotic dextrose 50 ml (premix), 3.375 g, Intravenous, Q8H, Miguel Chaudhari DO, Stopped at 12/01/20 0613  •  sodium chloride 0.9 % flush 10 mL, 10 mL, Intravenous, PRN, Miguel Chaudhari, , 10 mL at 11/30/20 1919  •  sodium chloride 0.9 % flush 10 mL, 10 mL,  Intravenous, Q12H, Miguel Chaudhari DO, 10 mL at 20 0159  •  sodium chloride 0.9 % flush 10 mL, 10 mL, Intravenous, PRN, Miguel Chaudhari DO, 10 mL at 20 0749  •  vancomycin (VANCOCIN) in iso-osmotic dextrose IVPB 1 g (premix) 200 mL, 1,000 mg, Intravenous, Q8H, Darnell Ray IV, PharmD, 1,000 mg at 20 0514    Antibiotics:  Anti-Infectives (From admission, onward)    Ordered     Dose/Rate Route Frequency Start Stop    20 2346  vancomycin (VANCOCIN) in iso-osmotic dextrose IVPB 1 g (premix) 200 mL     Ordering Provider: Darnell Ray IV, PharmD    1,000 mg  over 120 Minutes Intravenous Every 8 Hours 20 0600 20 0559    20 194  piperacillin-tazobactam (ZOSYN) 3.375 g in iso-osmotic dextrose 50 ml (premix)     Ordering Provider: Miguel Chaudhari DO    3.375 g  over 4 Hours Intravenous Every 8 Hours 20 0400 20 0359    20  vancomycin 2250 mg/500 mL 0.9% NS IVPB ([facility])     Ordering Provider: Miguel Chaudhari DO    25 mg/kg × 88.5 kg  over 120 Minutes Intravenous Once 20 1956 20 2359    20 194  piperacillin-tazobactam (ZOSYN) 3.375 g in iso-osmotic dextrose 50 ml (premix)     Ordering Provider: Miguel Chaudhari DO    3.375 g  over 30 Minutes Intravenous Once 20 19420 21020 194  Pharmacy to dose vancomycin     Ordering Provider: Miguel Chaudhari DO     Does not apply Continuous PRN 20            Review of Systems:  Remarkable for fevers malaise nausea abdominal pain neck pain    Rest of review of systems were reviewed and were unremarkable    Physical Exam:   Vital Signs  Temp (24hrs), Av.2 °F (38.4 °C), Min:99.4 °F (37.4 °C), Max:103.5 °F (39.7 °C)    Temp  Min: 99.4 °F (37.4 °C)  Max: 103.5 °F (39.7 °C)  BP  Min: 123/70  Max: 154/112  Pulse  Min: 94  Max: 133  Resp  Min: 16  Max: 18  SpO2  Min: 92 %  Max: 100 %    GENERAL: Awake and alert, appears uncomfortable  HEENT:  Normocephalic, atraumatic.  PERRL. EOMI. No conjunctival injection. No icterus. Oropharynx clear without evidence of thrush or exudate. No evidence of peridontal disease.    NECK: Supple without nuchal rigidity. No mass.  No adenopathy and no JVD tenderness bilaterally    HEART: RRR; with tachycardia no murmur  LUNGS: Clear to auscultation bilaterally   ABDOMEN: Soft, nontender, nondistended.   EXT:  No cyanosis, clubbing or edema. No cord.  :  Without Anne catheter.  MSK: No joint deformity  SKIN: Warm and dry without cutaneous eruptions on Inspection/palpation.    NEURO: Oriented to PPT.  PSYCHIATRIC: Normal insight and judgement. Cooperative with PE    Laboratory Data    Results from last 7 days   Lab Units 12/01/20  0521 11/30/20  1130   WBC 10*3/mm3 3.66 5.01   HEMOGLOBIN g/dL 10.1* 12.0   HEMATOCRIT % 30.7* 35.9   PLATELETS 10*3/mm3 114* 133*     Results from last 7 days   Lab Units 12/01/20  0521   SODIUM mmol/L 137   POTASSIUM mmol/L 3.1*   CHLORIDE mmol/L 105   CO2 mmol/L 20.0*   BUN mg/dL 5*   CREATININE mg/dL 0.69   GLUCOSE mg/dL 112*   CALCIUM mg/dL 7.9*     Results from last 7 days   Lab Units 11/30/20  1130   ALK PHOS U/L 233*   BILIRUBIN mg/dL 0.6   ALT (SGPT) U/L 211*   AST (SGOT) U/L 179*             Results from last 7 days   Lab Units 12/01/20  0215   LACTATE mmol/L 1.9             Estimated Creatinine Clearance: 145.2 mL/min (by C-G formula based on SCr of 0.69 mg/dL).      Microbiology:  No results found for: ACANTHNAEG, AFBCX, BPERTUSSISCX, BLOODCX  No results found for: BCIDPCR, CXREFLEX, CSFCX, CULTURETIS  No results found for: CULTURES, HSVCX, URCX  No results found for: EYECULTURE, GCCX, LABHSV  No results found for: LEGIONELLA, MRSACX, MUMPSCX, MYCOPLASCX  No results found for: NOCARDIACX, STOOLCX  No results found for: THROATCX, UNSTIMCULT, URINECX, CULTURE, VZVCULTUR  No results found for: VIRALCULTU, WOUNDCX        Radiology:  Imaging Results (Last 72 Hours)     Procedure Component  Value Units Date/Time    US Gallbladder [[id number]] Collected: 11/30/20 2207     Updated: 11/30/20 2209    Narrative:      US Abdomen Ltd    INDICATION:   Abnormal elevated liver enzymes. Abdominal pain with nausea and vomiting for one day.    COMPARISON:   CT abdomen same day    FINDINGS:  PANCREAS: Visualized portions of the pancreas are within normal limits.     LIVER: The echogenicity and echotexture of the hepatic parenchyma is within normal limits.  No hepatic mass. The intrahepatic bile ducts are normal in caliber. The common duct is normal in size at the reyna hepatis measuring 3 mm.    GALLBLADDER: No gallbladder wall thickening. There is an echogenic focus in the gallbladder neck that may reflect a gallstone or potentially tumefactive sludge. The gallbladder is otherwise normal.    RIGHT KIDNEY: The right kidney measures 10.0 cm. Renal cortical thickness and echogenicity is normal. No hydronephrosis.    OTHER: No ascites.      Impression:      Small gallstone in the gallbladder neck versus some tumefactive sludge. Otherwise normal right upper quadrant ultrasound.    Signer Name: Clinton Loyd MD   Signed: 11/30/2020 10:07 PM   Workstation Name: [facility]    [facility]    XR Chest 1 View [[id number]] Collected: 11/30/20 2147     Updated: 11/30/20 2149    Narrative:      PROCEDURE: CR Chest 1 Vw    COMPARISON:  No relevant comparison or correlation studies available at time of dictation.     INDICATIONS: fever; Viral infection, unspecified;Bandemia;Abnormal levels of other serum enzymes  Relevant clinical info: FEVER, NAUSEA, VOMITING X 1 WEEKS, DIFFICULTY BREATHING, COUGH    TECHNIQUE: Single AP  view of the chest    FINDINGS:  Cardiomediastinal silhouette is within normal limits given projection.  Lungs are relatively well inflated and clear. Osseous structures are intact.      Impression:      No acute disease.         Signer Name: Jeanne Reece MD   Signed: 11/30/2020 9:47  PM   Workstation Name: [facility]    [facility] [place]    CT Abdomen Pelvis With Contrast [[id number]] Collected: 11/30/20 1820     Updated: 11/30/20 1828    Narrative:      EXAMINATION: CT ABDOMEN PELVIS W CONTRAST-      INDICATION: LUQ abd pain; fever; covid negative; vomiting today      TECHNIQUE: CT abdomen pelvis with intravenous contrast administration     The radiation dose reduction device was turned on for each scan per the  ALARA (As Low as Reasonably Achievable) protocol.     COMPARISON: NONE     FINDINGS: Lung bases demonstrate atelectasis and/or scarring without  consolidation or effusion. Liver demonstrates homogeneity throughout  without focal liver lesion. Gallbladder unremarkable. No biliary  dilatation. Pancreas unremarkable. Spleen is enlarged to 14.5 cm of  indeterminate significance. Adrenals without distinct nodule. Kidneys  without hydronephrosis or hydroureter. No bulky retroperitoneal  adenopathy. Patent nonaneurysmal abdominal aorta. Celiac and SMA origins  patent. GI tract evaluation without focal thickening or disproportionate  dilatation of bowel to suggest mechanical obstructive process. Appendix  visualized and unremarkable. No free fluid or intra-abdominal free air.  Pelvic viscera demonstrates findings of likely current menstruation with  trace physiologically appropriate free fluid. No suspicious adnexal  lesion or loculated collection. No aggressive osseous or soft tissue  body wall findings.             Impression:      No mechanical obstructive process or loculated fluid  collection with appendix visualized and unremarkable. Redundancy of the  sigmoid colon without evidence for acute inflammation to suggest colitis  or diverticulitis. Current menstruation findings with physiologically  appropriate free fluid in the pelvic cul-de-sac                  Impression:   Leukopenia  Fever  Relatively low platelets (thrombocytopenia)  Atypical  lymphocytosis  Transaminitis  Gallstone  Splenomegaly  PLAN/RECOMMENDATIONS:   Thank you for asking us to see Whitney North, I recommend the following:  Peripheral smear remarkable for atypical lymphocytosis;    Patient has been exposed to Covid in August; repeat PCR testing is negative patient is not hypoxic.    I am not highly suspicious of Covid at this time    Splenomegaly low white count atypical lymphocyte predominance all suggestive of Ajith-Denney infection or possibly CMV    Check EBV IgM serology    Offer antipyretics and fluids and supportive care    Discontinue vancomycin/pharmacy consultation    D/c airborne precautions  Ok to have visitors    Probably can stop Zosyn soon    Follow-up blood cultures    If this is summertime would consider doxycycline patient denies tick exposure and the atypical lymphocytosis fits best with a mononucleosis-like syndrome             Alexei Woodson MD  12/1/2020  09:57 EST

## 2020-12-01 NOTE — PLAN OF CARE
Goal Outcome Evaluation:  Plan of Care Reviewed With: patient  Progress: no change  Outcome Summary: VSS with the exception of fever of 103.5.  Patient on RA.  Fluids started and tylenol given for fever.  will continue to monitor.

## 2020-12-01 NOTE — PROGRESS NOTES
[facility] Medicine Services  PROGRESS NOTE    Patient Name: Whitney North  : 2000  MRN: [id number]    Date of Admission: 2020  Primary Care Physician: Vik Sandoval MD    Subjective   Subjective     CC:  fevers    HPI:  Feels generally unwell.  Having body aches, nausea headache and cough.  Some left sided abdominal pain for the past 3 to 4 days.    Review of Systems  Gen-fevers  CV- No chest pain, palpitations  Resp-+ cough  GI-+ nausea and abdominal discomfort        Objective   Objective     Vital Signs:   Temp:  [99.1 °F (37.3 °C)-103.5 °F (39.7 °C)] 99.1 °F (37.3 °C)  Heart Rate:  [] 102  Resp:  [16-20] 20  BP: (118-148)/(62-94) 118/62        Physical Exam:  With patient's consent, physical exam was conducted via visual telemedicine encounter in the interest of PPE conservation and to limit provider exposure to COVID-19.     Constitutional -appears uncomfortable, in bed  HEENT-NCAT, mucous membranes moist  CV-RRR by telemetry  Resp-CTAB, no wheezes, rhonchi or rales  Abd-nondistended, overweight  Ext-No lower extremity cyanosis, clubbing or edema bilaterally  Neuro-alert and oriented, speech clear, moves all extremities   Psych-slightly flat affect   Skin- No rash on exposed UE or LE bilaterally        Results Reviewed:  Results from last 7 days   Lab Units 20  0521 20  1130   WBC 10*3/mm3 3.66 5.01   HEMOGLOBIN g/dL 10.1* 12.0   HEMATOCRIT % 30.7* 35.9   PLATELETS 10*3/mm3 114* 133*     Results from last 7 days   Lab Units 20  0521 20  1130   SODIUM mmol/L 137 134*   POTASSIUM mmol/L 3.1* 3.4*   CHLORIDE mmol/L 105 102   CO2 mmol/L 20.0* 19.0*   BUN mg/dL 5* 8   CREATININE mg/dL 0.69 0.73   GLUCOSE mg/dL 112* 111*   CALCIUM mg/dL 7.9* 8.6   ALT (SGPT) U/L  --  211*   AST (SGOT) U/L  --  179*     Estimated Creatinine Clearance: 145.2 mL/min (by C-G formula based on SCr of 0.69 mg/dL).    Microbiology Results Abnormal     Procedure  Component Value - Date/Time    Blood Culture - Blood, Hand, Left [[id number]] Collected: 11/30/20 1120    Lab Status: Preliminary result Specimen: Blood from Hand, Left Updated: 12/01/20 1246     Blood Culture No growth at 24 hours    Legionella Antigen, Urine - Urine, Urine, Clean Catch [[id number]]  (Normal) Collected: 11/30/20 1637    Lab Status: Final result Specimen: Urine, Clean Catch Updated: 12/01/20 0300     LEGIONELLA ANTIGEN, URINE Negative    COVID-19 PCR, [facility], NP SWAB IN [facility]AL TRANSPORT MEDIA 24-30 HR TAT - Swab, Nasopharynx [[id number]] Collected: 11/30/20 1251    Lab Status: Final result Specimen: Swab from Nasopharynx Updated: 11/30/20 1714     SARS-CoV-2 VICTORIA Not Detected    Respiratory Panel PCR w/COVID-19(SARS-CoV-2) [facility]LE In-House, NP Swab in UTM/VTM, 3-4 HR TAT - Swab, Nasopharynx [[id number]]  (Normal) Collected: 11/30/20 1509    Lab Status: Final result Specimen: Swab from Nasopharynx Updated: 11/30/20 1623     ADENOVIRUS, PCR Not Detected     Coronavirus 229E Not Detected     Coronavirus HKU1 Not Detected     Coronavirus NL63 Not Detected     Coronavirus OC43 Not Detected     COVID19 Not Detected     Human Metapneumovirus Not Detected     Human Rhinovirus/Enterovirus Not Detected     Influenza A PCR Not Detected     Influenza A H1 Not Detected     Influenza A H1 2009 PCR Not Detected     Influenza A H3 Not Detected     Influenza B PCR Not Detected     Parainfluenza Virus 1 Not Detected     Parainfluenza Virus 2 Not Detected     Parainfluenza Virus 3 Not Detected     Parainfluenza Virus 4 Not Detected     RSV, PCR Not Detected     Bordetella pertussis pcr Not Detected     Bordetella parapertussis PCR Not Detected     Chlamydophila pneumoniae PCR Not Detected     Mycoplasma pneumo by PCR Not Detected    Narrative:      Fact sheet for providers: https://docs.[employer]/wp-content/uploads/OIA6440-9033-KB6.1-EUA-Provider-Fact-Sheet-3.pdf    Fact sheet for patients:  https://docs.[employer]/wp-content/uploads/TAY0600-2222-TG9.1-EUA-Patient-Fact-Sheet-1.pdf          Imaging Results (Last 24 Hours)     Procedure Component Value Units Date/Time    CT Abdomen Pelvis With Contrast [[id number]] Collected: 11/30/20 1820     Updated: 12/01/20 1304    Narrative:      EXAMINATION: CT ABDOMEN AND PELVIS W CONTRAST-      INDICATION: Left upper quadrant abdominal pain; fever; COVID-19  negative; vomiting today.      TECHNIQUE: CT abdomen and pelvis with intravenous contrast  administration.     The radiation dose reduction device was turned on for each scan per the  ALARA (As Low as Reasonably Achievable) protocol.     COMPARISON: None.     FINDINGS: Lung bases demonstrate atelectasis and/or scarring without  consolidation or effusion. Liver demonstrates homogeneity throughout  without focal liver lesion. Gallbladder is unremarkable. No biliary  dilatation. Pancreas is unremarkable. Spleen is enlarged to 14.5 cm of  indeterminate significance. Adrenals without distinct nodule. Kidneys  without hydronephrosis or hydroureter. No bulky retroperitoneal  adenopathy. Patent nonaneurysmal abdominal aorta. Celiac and SMA origins  are patent. GI tract evaluation without focal thickening or  disproportionate dilatation of bowel to suggest mechanical obstructive  process. Appendix visualized and unremarkable. No free fluid or  intraabdominal free air. Pelvic viscera demonstrates findings of likely  current menstruation with trace physiologically appropriate free fluid.  No suspicious adnexal lesion or loculated collection. No aggressive  osseous or soft tissue body wall findings.       Impression:      No mechanical obstructive process or loculated fluid  collection with appendix visualized and unremarkable. Redundancy of the  sigmoid colon without evidence for acute inflammation to suggest colitis  or diverticulitis. Current menstruation findings with physiologically  appropriate free fluid in the  pelvic cul-de-sac.     D:  11/30/2020  E:  12/01/2020     This report was finalized on 12/1/2020 1:00 PM by Dr. Stan Darby.       US Gallbladder [[id number]] Collected: 11/30/20 2207     Updated: 11/30/20 2209    Narrative:      US Abdomen Ltd    INDICATION:   Abnormal elevated liver enzymes. Abdominal pain with nausea and vomiting for one day.    COMPARISON:   CT abdomen same day    FINDINGS:  PANCREAS: Visualized portions of the pancreas are within normal limits.     LIVER: The echogenicity and echotexture of the hepatic parenchyma is within normal limits.  No hepatic mass. The intrahepatic bile ducts are normal in caliber. The common duct is normal in size at the reyna hepatis measuring 3 mm.    GALLBLADDER: No gallbladder wall thickening. There is an echogenic focus in the gallbladder neck that may reflect a gallstone or potentially tumefactive sludge. The gallbladder is otherwise normal.    RIGHT KIDNEY: The right kidney measures 10.0 cm. Renal cortical thickness and echogenicity is normal. No hydronephrosis.    OTHER: No ascites.      Impression:      Small gallstone in the gallbladder neck versus some tumefactive sludge. Otherwise normal right upper quadrant ultrasound.    Signer Name: Clinton Loyd MD   Signed: 11/30/2020 10:07 PM   Workstation Name: [facility]    [facility]    XR Chest 1 View [[id number]] Collected: 11/30/20 2147     Updated: 11/30/20 2149    Narrative:      PROCEDURE: CR Chest 1 Vw    COMPARISON:  No relevant comparison or correlation studies available at time of dictation.     INDICATIONS: fever; Viral infection, unspecified;Bandemia;Abnormal levels of other serum enzymes  Relevant clinical info: FEVER, NAUSEA, VOMITING X 1 WEEKS, DIFFICULTY BREATHING, COUGH    TECHNIQUE: Single AP  view of the chest    FINDINGS:  Cardiomediastinal silhouette is within normal limits given projection.  Lungs are relatively well inflated and clear. Osseous structures are intact.       Impression:      No acute disease.         Signer Name: Jeanne Reece MD   Signed: 11/30/2020 9:47 PM   Workstation Name: [facility]    [facility] [place]               I have reviewed the medications:  Scheduled Meds:enoxaparin, 40 mg, Subcutaneous, Q24H  famotidine, 20 mg, Intravenous, Q12H  piperacillin-tazobactam, 3.375 g, Intravenous, Q8H  sodium chloride, 10 mL, Intravenous, Q12H      Continuous Infusions:lactated ringers, 100 mL/hr, Last Rate: 100 mL/hr (12/01/20 1531)      PRN Meds:.•  acetaminophen **OR** acetaminophen **OR** acetaminophen  •  ibuprofen  •  magnesium sulfate **OR** magnesium sulfate **OR** magnesium sulfate  •  ondansetron  •  potassium chloride **OR** potassium chloride **OR** potassium chloride  •  sodium chloride  •  sodium chloride    Assessment/Plan   Assessment & Plan     Active Hospital Problems    Diagnosis  POA   • **SIRS (systemic inflammatory response syndrome) (CMS/HCC) [R65.10]  Unknown   • Systemic viral illness [B34.9]  Yes   • Transaminitis [R74.01]  Unknown      Resolved Hospital Problems   No resolved problems to display.        Brief Hospital Course to date:  Whitney North is a 20 y.o. female with limited past medical history presents with fevers chills, nausea vomiting, cough, abdominal discomfort and myalgias    Febrile syndrome  -With atypical lymphocytosis and splenomegaly, viral infection such as EBV or CMV possible  -Respiratory viral PCR panel negative for COVID-19 or other common respiratory pathogens  -Continue IV fluid hydration with supportive care.  -CBC with differential in a.m.  -Repeat CMP in the morning as well      DVT Prophylaxis: Lovenox      Disposition: I expect the patient to be discharged home TBD    CODE STATUS:   Code Status and Medical Interventions:   Ordered at: 11/30/20 5509     Level Of Support Discussed With:    Patient     Code Status:    CPR     Medical Interventions (Level of Support Prior to Arrest):    Full        Marquis Gaitan MD  12/01/20

## 2020-12-02 ENCOUNTER — APPOINTMENT (OUTPATIENT)
Dept: MRI IMAGING | Facility: HOSPITAL | Age: 20
End: 2020-12-02

## 2020-12-02 PROBLEM — R50.9 FEVER: Status: ACTIVE | Noted: 2020-12-02

## 2020-12-02 LAB
ALBUMIN SERPL-MCNC: 3.2 G/DL (ref 3.5–5.2)
ALBUMIN/GLOB SERPL: 0.9 G/DL
ALP SERPL-CCNC: 298 U/L (ref 39–117)
ALT SERPL W P-5'-P-CCNC: 206 U/L (ref 1–33)
ANION GAP SERPL CALCULATED.3IONS-SCNC: 10 MMOL/L (ref 5–15)
AST SERPL-CCNC: 128 U/L (ref 1–32)
BASOPHILS # BLD MANUAL: 0 10*3/MM3 (ref 0–0.2)
BASOPHILS NFR BLD AUTO: 0 % (ref 0–1.5)
BILIRUB SERPL-MCNC: 1 MG/DL (ref 0–1.2)
BUN SERPL-MCNC: 7 MG/DL (ref 6–20)
BUN/CREAT SERPL: 11.7 (ref 7–25)
CALCIUM SPEC-SCNC: 8.5 MG/DL (ref 8.6–10.5)
CHLORIDE SERPL-SCNC: 105 MMOL/L (ref 98–107)
CMV IGG SERPL IA-ACNC: <0.6 U/ML (ref 0–0.59)
CMV IGM SERPL IA-ACNC: <30 AU/ML (ref 0–29.9)
CO2 SERPL-SCNC: 21 MMOL/L (ref 22–29)
CREAT SERPL-MCNC: 0.6 MG/DL (ref 0.57–1)
DEPRECATED RDW RBC AUTO: 41.1 FL (ref 37–54)
EBV NA IGG SER IA-ACNC: <18 U/ML (ref 0–17.9)
EBV VCA IGG SER IA-ACNC: 94.3 U/ML (ref 0–17.9)
EBV VCA IGM SER IA-ACNC: 47.3 U/ML (ref 0–35.9)
EBV VCA IGM SER IA-ACNC: 55.5 U/ML (ref 0–35.9)
EOSINOPHIL # BLD MANUAL: 0.2 10*3/MM3 (ref 0–0.4)
EOSINOPHIL NFR BLD MANUAL: 4 % (ref 0.3–6.2)
ERYTHROCYTE [DISTWIDTH] IN BLOOD BY AUTOMATED COUNT: 13.2 % (ref 12.3–15.4)
GFR SERPL CREATININE-BSD FRML MDRD: 125 ML/MIN/1.73
GLOBULIN UR ELPH-MCNC: 3.7 GM/DL
GLUCOSE SERPL-MCNC: 92 MG/DL (ref 65–99)
HCT VFR BLD AUTO: 33 % (ref 34–46.6)
HGB BLD-MCNC: 10.7 G/DL (ref 12–15.9)
INR PPP: 1.21 (ref 0.85–1.16)
LYMPHOCYTES # BLD MANUAL: 1.05 10*3/MM3 (ref 0.7–3.1)
LYMPHOCYTES NFR BLD MANUAL: 11 % (ref 5–12)
LYMPHOCYTES NFR BLD MANUAL: 21 % (ref 19.6–45.3)
MAGNESIUM SERPL-MCNC: 2.2 MG/DL (ref 1.6–2.6)
MCH RBC QN AUTO: 27.6 PG (ref 26.6–33)
MCHC RBC AUTO-ENTMCNC: 32.4 G/DL (ref 31.5–35.7)
MCV RBC AUTO: 85.3 FL (ref 79–97)
METAMYELOCYTES NFR BLD MANUAL: 1 % (ref 0–0)
MONOCYTES # BLD AUTO: 0.55 10*3/MM3 (ref 0.1–0.9)
NEUTROPHILS # BLD AUTO: 2.86 10*3/MM3 (ref 1.7–7)
NEUTROPHILS NFR BLD MANUAL: 40 % (ref 42.7–76)
NEUTS BAND NFR BLD MANUAL: 17 % (ref 0–5)
NRBC SPEC MANUAL: 0 /100 WBC (ref 0–0.2)
PLAT MORPH BLD: NORMAL
PLATELET # BLD AUTO: 116 10*3/MM3 (ref 140–450)
PMV BLD AUTO: 12.5 FL (ref 6–12)
POTASSIUM SERPL-SCNC: 4 MMOL/L (ref 3.5–5.2)
PROT SERPL-MCNC: 6.9 G/DL (ref 6–8.5)
PROTHROMBIN TIME: 15 SECONDS (ref 11.5–14)
RBC # BLD AUTO: 3.87 10*6/MM3 (ref 3.77–5.28)
RBC MORPH BLD: NORMAL
SERVICE CMNT-IMP: ABNORMAL
SODIUM SERPL-SCNC: 136 MMOL/L (ref 136–145)
VARIANT LYMPHS NFR BLD MANUAL: 6 % (ref 0–5)
WBC # BLD AUTO: 5.02 10*3/MM3 (ref 3.4–10.8)
WBC MORPH BLD: NORMAL

## 2020-12-02 PROCEDURE — 25010000002 CEFTRIAXONE PER 250 MG: Performed by: INTERNAL MEDICINE

## 2020-12-02 PROCEDURE — 85610 PROTHROMBIN TIME: CPT | Performed by: INTERNAL MEDICINE

## 2020-12-02 PROCEDURE — 80053 COMPREHEN METABOLIC PANEL: CPT | Performed by: INTERNAL MEDICINE

## 2020-12-02 PROCEDURE — 96375 TX/PRO/DX INJ NEW DRUG ADDON: CPT

## 2020-12-02 PROCEDURE — 25010000002 PIPERACILLIN SOD-TAZOBACTAM PER 1 G: Performed by: INTERNAL MEDICINE

## 2020-12-02 PROCEDURE — 85007 BL SMEAR W/DIFF WBC COUNT: CPT | Performed by: INTERNAL MEDICINE

## 2020-12-02 PROCEDURE — 25010000002 MORPHINE PER 10 MG: Performed by: FAMILY MEDICINE

## 2020-12-02 PROCEDURE — G0378 HOSPITAL OBSERVATION PER HR: HCPCS

## 2020-12-02 PROCEDURE — 83735 ASSAY OF MAGNESIUM: CPT | Performed by: INTERNAL MEDICINE

## 2020-12-02 PROCEDURE — 86645 CMV ANTIBODY IGM: CPT | Performed by: INTERNAL MEDICINE

## 2020-12-02 PROCEDURE — 25010000002 ENOXAPARIN PER 10 MG: Performed by: INTERNAL MEDICINE

## 2020-12-02 PROCEDURE — 25010000002 ONDANSETRON PER 1 MG: Performed by: INTERNAL MEDICINE

## 2020-12-02 PROCEDURE — 85025 COMPLETE CBC W/AUTO DIFF WBC: CPT | Performed by: INTERNAL MEDICINE

## 2020-12-02 PROCEDURE — 99225 PR SBSQ OBSERVATION CARE/DAY 25 MINUTES: CPT | Performed by: FAMILY MEDICINE

## 2020-12-02 PROCEDURE — 96372 THER/PROPH/DIAG INJ SC/IM: CPT

## 2020-12-02 PROCEDURE — 96366 THER/PROPH/DIAG IV INF ADDON: CPT

## 2020-12-02 PROCEDURE — 96376 TX/PRO/DX INJ SAME DRUG ADON: CPT

## 2020-12-02 PROCEDURE — 74181 MRI ABDOMEN W/O CONTRAST: CPT

## 2020-12-02 RX ORDER — BUTALBITAL, ACETAMINOPHEN AND CAFFEINE 50; 325; 40 MG/1; MG/1; MG/1
2 TABLET ORAL EVERY 4 HOURS PRN
Status: DISCONTINUED | OUTPATIENT
Start: 2020-12-02 | End: 2020-12-04 | Stop reason: HOSPADM

## 2020-12-02 RX ORDER — MORPHINE SULFATE 2 MG/ML
2 INJECTION, SOLUTION INTRAMUSCULAR; INTRAVENOUS EVERY 4 HOURS PRN
Status: DISCONTINUED | OUTPATIENT
Start: 2020-12-02 | End: 2020-12-03

## 2020-12-02 RX ADMIN — IBUPROFEN 600 MG: 600 TABLET, FILM COATED ORAL at 03:22

## 2020-12-02 RX ADMIN — ACETAMINOPHEN 650 MG: 325 TABLET, FILM COATED ORAL at 23:50

## 2020-12-02 RX ADMIN — TAZOBACTAM SODIUM AND PIPERACILLIN SODIUM 3.38 G: 375; 3 INJECTION, SOLUTION INTRAVENOUS at 05:40

## 2020-12-02 RX ADMIN — BUTALBITAL, ACETAMINOPHEN, AND CAFFEINE 2 TABLET: 50; 325; 40 TABLET ORAL at 13:48

## 2020-12-02 RX ADMIN — ONDANSETRON 4 MG: 2 INJECTION INTRAMUSCULAR; INTRAVENOUS at 23:50

## 2020-12-02 RX ADMIN — CEFTRIAXONE SODIUM 2 G: 2 INJECTION, POWDER, FOR SOLUTION INTRAMUSCULAR; INTRAVENOUS at 16:41

## 2020-12-02 RX ADMIN — IBUPROFEN 600 MG: 600 TABLET, FILM COATED ORAL at 13:41

## 2020-12-02 RX ADMIN — TAZOBACTAM SODIUM AND PIPERACILLIN SODIUM 3.38 G: 375; 3 INJECTION, SOLUTION INTRAVENOUS at 12:30

## 2020-12-02 RX ADMIN — BENZONATATE 100 MG: 100 CAPSULE ORAL at 10:19

## 2020-12-02 RX ADMIN — ACETAMINOPHEN 650 MG: 325 TABLET, FILM COATED ORAL at 10:43

## 2020-12-02 RX ADMIN — FAMOTIDINE 20 MG: 10 INJECTION INTRAVENOUS at 10:19

## 2020-12-02 RX ADMIN — SODIUM CHLORIDE, PRESERVATIVE FREE 10 ML: 5 INJECTION INTRAVENOUS at 20:20

## 2020-12-02 RX ADMIN — ACETAMINOPHEN 650 MG: 325 TABLET, FILM COATED ORAL at 05:35

## 2020-12-02 RX ADMIN — ACETAMINOPHEN 650 MG: 325 TABLET, FILM COATED ORAL at 00:34

## 2020-12-02 RX ADMIN — BENZONATATE 100 MG: 100 CAPSULE ORAL at 20:20

## 2020-12-02 RX ADMIN — FAMOTIDINE 20 MG: 10 INJECTION INTRAVENOUS at 20:20

## 2020-12-02 RX ADMIN — MORPHINE SULFATE 2 MG: 2 INJECTION, SOLUTION INTRAMUSCULAR; INTRAVENOUS at 12:30

## 2020-12-02 RX ADMIN — ENOXAPARIN SODIUM 40 MG: 40 INJECTION SUBCUTANEOUS at 05:35

## 2020-12-02 RX ADMIN — ONDANSETRON 4 MG: 2 INJECTION INTRAMUSCULAR; INTRAVENOUS at 10:19

## 2020-12-02 NOTE — PROGRESS NOTES
[facility] Medicine Services  PROGRESS NOTE    Patient Name: Whitney North  : 2000  MRN: [id number]    Date of Admission: 2020  Primary Care Physician: Vki Sandoval MD    Subjective   Subjective     CC:  Fever and headache    HPI:  Patient is a 20-year-old who presented with fever of uncertain etiology.  Dr. Woodson with infectious diseases been consulted and following.  He feels her fever is most likely secondary to a viral infection, likely mono.  Her work-up has been negative thus far.  She did have concern for gallstone on ultrasound of the right upper quadrant and an MRCP was ordered which was negative.  She complains of headache with neck pain.  Will try Tylenol, ibuprofen, morphine and Fioricet as needed.  She also complains of left lower quadrant pain intermittently.  Her last menstrual period was about 9 days ago.    Review of Systems  General: Positive fever, chills, fatigue  ENT: No sore throat, trouble swallowing or changes in vision  Respiratory: No shortness of breath, positive cough, denies wheezing or fast breathing  Cardiovascular: No chest pain, palpitations, dyspnea with exertion  Gastrointestinal: Positive nausea vomiting and abdominal pain  Musculoskeletal: No difficulty walking, no weakness  Vascular: No cyanosis or clubbing  Lymphatic: No peripheral edema, no lymphadenopathy  Neurologic: Positive headache, denies confusion, dizziness  Psychiatric: Positive anxiety.       Objective   Objective     Vital Signs:   Temp:  [98 °F (36.7 °C)-102.6 °F (39.2 °C)] 100 °F (37.8 °C)  Heart Rate:  [] 110  Resp:  [16-20] 18  BP: (110-147)/(59-88) 127/81        Physical Exam:  Constitutional: No acute distress, awake, alert, nontoxic, overweight body habitus  Eyes: Pupils equal, sclerae anicteric, no conjunctival injection  HENT: NCAT, mucous membranes moist  Neck: Supple, no thyromegaly, no lymphadenopathy  Respiratory: Clear to auscultation bilaterally, good  effort, nonlabored respirations   Cardiovascular: RRR  Gastrointestinal: Positive bowel sounds, soft, tender in the right upper quadrant and left lower quadrant, nondistended  Musculoskeletal: No peripheral edema, normal muscle tone for age  Psychiatric: Appropriate affect, good insight and judgement, cooperative  Neurologic: Oriented x 3, movements symmetric BUE and BLE, Cranial Nerves grossly intact, speech clear and fluent  Skin: Approximately 10 scattered erythematous papules over upper chest she reports is resolving rash that started last night when she was hot with a fever, no jaundice, no petechiae, no mottling      Results Reviewed:  Results from last 7 days   Lab Units 12/02/20  0536 12/01/20  0521 11/30/20  1130   WBC 10*3/mm3 5.02 3.66 5.01   HEMOGLOBIN g/dL 10.7* 10.1* 12.0   HEMATOCRIT % 33.0* 30.7* 35.9   PLATELETS 10*3/mm3 116* 114* 133*   INR  1.21*  --   --      Results from last 7 days   Lab Units 12/02/20 0536 12/01/20 0521 11/30/20  1130   SODIUM mmol/L 136 137 134*   POTASSIUM mmol/L 4.0 3.1* 3.4*   CHLORIDE mmol/L 105 105 102   CO2 mmol/L 21.0* 20.0* 19.0*   BUN mg/dL 7 5* 8   CREATININE mg/dL 0.60 0.69 0.73   GLUCOSE mg/dL 92 112* 111*   CALCIUM mg/dL 8.5* 7.9* 8.6   ALT (SGPT) U/L 206*  --  211*   AST (SGOT) U/L 128*  --  179*     Estimated Creatinine Clearance: 164.8 mL/min (by C-G formula based on SCr of 0.6 mg/dL).    Microbiology Results Abnormal     Procedure Component Value - Date/Time    Blood Culture - Blood, Hand, Left [[id number]] Collected: 11/30/20 1120    Lab Status: Preliminary result Specimen: Blood from Hand, Left Updated: 12/02/20 1245     Blood Culture No growth at 2 days    Blood Culture - Blood, Hand, Left [[id number]] Collected: 11/30/20 2031    Lab Status: Preliminary result Specimen: Blood from Hand, Left Updated: 12/01/20 2132     Blood Culture No growth at 24 hours    Blood Culture - Blood, Hand, Right [[id number]] Collected: 11/30/20 2025    Lab Status: Preliminary  result Specimen: Blood from Hand, Right Updated: 12/01/20 2132     Blood Culture No growth at 24 hours    Legionella Antigen, Urine - Urine, Urine, Clean Catch [[id number]]  (Normal) Collected: 11/30/20 1637    Lab Status: Final result Specimen: Urine, Clean Catch Updated: 12/01/20 0300     LEGIONELLA ANTIGEN, URINE Negative    COVID-19 PCR, [facility], NP SWAB IN [facility] TRANSPORT MEDIA 24-30 HR TAT - Swab, Nasopharynx [[id number]] Collected: 11/30/20 1251    Lab Status: Final result Specimen: Swab from Nasopharynx Updated: 11/30/20 1714     SARS-CoV-2 VICTORIA Not Detected    Respiratory Panel PCR w/COVID-19(SARS-CoV-2) [facility] In-House, NP Swab in UTM/VTM, 3-4 HR TAT - Swab, Nasopharynx [[id number]]  (Normal) Collected: 11/30/20 1509    Lab Status: Final result Specimen: Swab from Nasopharynx Updated: 11/30/20 1623     ADENOVIRUS, PCR Not Detected     Coronavirus 229E Not Detected     Coronavirus HKU1 Not Detected     Coronavirus NL63 Not Detected     Coronavirus OC43 Not Detected     COVID19 Not Detected     Human Metapneumovirus Not Detected     Human Rhinovirus/Enterovirus Not Detected     Influenza A PCR Not Detected     Influenza A H1 Not Detected     Influenza A H1 2009 PCR Not Detected     Influenza A H3 Not Detected     Influenza B PCR Not Detected     Parainfluenza Virus 1 Not Detected     Parainfluenza Virus 2 Not Detected     Parainfluenza Virus 3 Not Detected     Parainfluenza Virus 4 Not Detected     RSV, PCR Not Detected     Bordetella pertussis pcr Not Detected     Bordetella parapertussis PCR Not Detected     Chlamydophila pneumoniae PCR Not Detected     Mycoplasma pneumo by PCR Not Detected    Narrative:      Fact sheet for providers: https://docs.[employer]/wp-content/uploads/HWE5651-4706-JG3.1-EUA-Provider-Fact-Sheet-3.pdf    Fact sheet for patients: https://docs.[employer]/wp-content/uploads/KME5294-8231-FT3.1-EUA-Patient-Fact-Sheet-1.pdf          Imaging Results (Last 24  Hours)     Procedure Component Value Units Date/Time    MRI abdomen wo contrast mrcp [[id number]] Collected: 12/02/20 1215     Updated: 12/02/20 1223    Narrative:      EXAMINATION: MRI ABDOMEN WO CONTRAST MRCP-      INDICATION: Abdominal pain, biliary obstruction suspected (Ped 0-18y);  B34.9-Viral infection, unspecified; D72.825-Bandemia; R74.8-Abnormal  levels of other serum enzymes     TECHNIQUE: Multiplanar MRI of the abdomen without intravenous contrast  with MRCP sequencing        COMPARISON: CT abdomen pelvis 11/30/2020, ultrasound 11/30/2020     FINDINGS: Lung bases demonstrate atelectasis and reticular  opacifications without pleural effusion. Liver without focal lesion  specifically no T2 hyperintense focus of abnormality and no significant  signal dropout on opposed phase imaging. Gallbladder unremarkable  without filling defect. MRCP sequences including 3-D space  reconstruction reveals grossly normal caliber and contour of the  tapering common bile duct measuring 5 to 6 mm at maximum dimension to  the level the ampulla. Indeterminate for evaluation of pancreas divisum  due to motion and overall technique. Pancreas without focal lesion.  Spleen is mildly enlarged at 14 cm in craniocaudal dimension. No  ascites. No abnormal findings of the proximal visualized GI tract.  Adrenals without nodule. Kidneys without hydronephrosis or hydroureter.  No bulky retroperitoneal or upper abdominal adenopathy. No aggressive  bone marrow signal findings or acute soft tissue body wall findings.       Impression:      1. Lung bases demonstrate opacifications somewhat reticular in opacity  left greater than right concerning for atelectasis versus airspace  disease without pleural effusion  2. No focal liver lesion or abnormality of the biliary system with  motion degradation on imaging however no evidence for cholelithiasis or  biliary dilatation/irregularity. Previously identified cholelithiasis on  ultrasound not clearly  evident on the current exam  3. Mild splenomegaly at 14 cm craniocaudal dimension greater than  expected for normal without focal splenic lesion or perisplenic ascites                     I have reviewed the medications:  Scheduled Meds:enoxaparin, 40 mg, Subcutaneous, Q24H  famotidine, 20 mg, Intravenous, Q12H  piperacillin-tazobactam, 3.375 g, Intravenous, Q8H  sodium chloride, 10 mL, Intravenous, Q12H      Continuous Infusions:   PRN Meds:.•  acetaminophen **OR** acetaminophen **OR** acetaminophen  •  benzonatate  •  butalbital-acetaminophen-caffeine  •  ibuprofen  •  magnesium sulfate **OR** magnesium sulfate **OR** magnesium sulfate  •  Morphine  •  ondansetron  •  potassium chloride **OR** potassium chloride **OR** potassium chloride  •  sodium chloride  •  sodium chloride    Assessment/Plan   Assessment & Plan     Active Hospital Problems    Diagnosis  POA   • **SIRS (systemic inflammatory response syndrome) (CMS/HCC) [R65.10]  Unknown   • Fever [R50.9]  Yes   • Systemic viral illness [B34.9]  Yes   • Transaminitis [R74.01]  Unknown      Resolved Hospital Problems   No resolved problems to display.        Brief Hospital Course to date:  Whitney North is a 20 y.o. female who presented with fever of uncertain etiology, meeting criteria for SIRS and suspected viral etiology (question mononucleosis).  Infectious diseases consulted.    SIRS  Fever of uncertain etiology  Possible mononucleosis  Splenomegaly  -Consulted infectious disease, Dr. Woodson follows  -Continue Zosyn for now  -Continue to monitor expectantly    Headache  -Discussed possibility of lumbar puncture with infectious disease, will hold off for now.  -We will treat supportively with pain meds as needed    Elevated liver enzymes  -We will continue to trend    Mild anemia  -Continue to trend, hemoglobin 10.7    Cholelithiasis seen on ultrasound of the right upper quadrant  -MRCP was negative for stones or ductal dilation    DVT Prophylaxis:  Lovenox      Disposition: I expect the patient to be discharged when she is afebrile for 24 hours.    CODE STATUS:   Code Status and Medical Interventions:   Ordered at: 11/30/20 1840     Level Of Support Discussed With:    Patient     Code Status:    CPR     Medical Interventions (Level of Support Prior to Arrest):    Full       Mary Avila MD  12/02/20

## 2020-12-02 NOTE — PROGRESS NOTES
INFECTIOUS DISEASE CONSULT/INITIAL HOSPITAL VISIT    Whitney North  2000  [id number]    Date of Consult: 12/2/2020    Admission Date: 11/30/2020      Requesting Provider: No ref. provider found  Evaluating Physician: Alexei Woodson MD    Reason for Consultation: Fevers    History of present illness:    Patient is a 20 y.o. female presents the emergency room with fevers, chills, myalgias.  Patient allegedly had Covid in August 2020.  Patient been feeling ill for the last 7 days and reports flulike symptoms, fevers, chills, myalgias.  For the last several days developed nausea and vomiting.  Patient midst to right upper quadrant abdominal pain.    Patient has mild cough, shortness of breath, nasal congestion.  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  Patient wears PPE but is around patients with Covid regularly.    Felt ill 1 week ago.  Denies sore throat, admits to swollen lymph nodes in sides of neck;    Has left sided abdominal pain.  Says that when she had Covid in August she had anosmia she denies anosmia currently.    She feels worsened in August    When she has fever she feels profoundly weak and debilitated.    12/2/2020 patient has had improvement in fevers is on Motrin and Tylenol intermittently reports very severe headaches the worst of her life.  Patient is a accurate historian does report some photophobia does report some neck tenderness is able to flex her neck when asked.  Patient had MRCP earlier today to look for biliary ductal obstruction.    Patient's mother is by bedside today reports that she has severe case of mononucleosis requiring hospitalization when she was a teenager.    12/3/20; EBV IgM is positive; fevers are better; headache controled yesterday but coming back today; fevers better; no rash, sore throat; feels better      Past Medical History:   Diagnosis Date   • Acne    • Dysmenorrhea 2016   • Irregular menses 2016       Past Surgical History:   Procedure  Laterality Date   • NO PAST SURGERIES         Family History   Problem Relation Age of Onset   • Osteoporosis Paternal Grandmother    • Hypertension Maternal Grandmother    • Heart disease Maternal Grandmother    • Breast cancer Other        Social History     Socioeconomic History   • Marital status: Single     Spouse name: Not on file   • Number of children: 0   • Years of education: Not on file   • Highest education level: Not on file   Occupational History   • Occupation: STUDENT     Comment: [employer]   Tobacco Use   • Smoking status: Never Smoker   • Smokeless tobacco: Never Used   Substance and Sexual Activity   • Alcohol use: No   • Drug use: No   • Sexual activity: Not Currently     Partners: Male     Comment: LAST SEXUAL CONTACT IN MAY       No Known Allergies      Medication:    Current Facility-Administered Medications:   •  acetaminophen (TYLENOL) tablet 650 mg, 650 mg, Oral, Q4H PRN, 650 mg at 12/02/20 1043 **OR** acetaminophen (TYLENOL) 160 MG/5ML solution 650 mg, 650 mg, Oral, Q4H PRN **OR** acetaminophen (TYLENOL) suppository 650 mg, 650 mg, Rectal, Q4H PRN, Miguel Chaudhari DO  •  benzonatate (TESSALON) capsule 100 mg, 100 mg, Oral, TID PRN, Mimi More, APRN, 100 mg at 12/02/20 1019  •  butalbital-acetaminophen-caffeine (FIORICET, ESGIC) -40 MG per tablet 2 tablet, 2 tablet, Oral, Q4H PRN, Mary Avila MD, 2 tablet at 12/02/20 1348  •  cefTRIAXone (ROCEPHIN) 2 g/100 mL 0.9% NS IVPB (MBP), 2 g, Intravenous, Q24H, Alexei Woodson MD  •  enoxaparin (LOVENOX) syringe 40 mg, 40 mg, Subcutaneous, Q24H, Miguel Chaudhari DO, 40 mg at 12/02/20 0535  •  famotidine (PEPCID) injection 20 mg, 20 mg, Intravenous, Q12H, Miguel Chaudhari DO, 20 mg at 12/02/20 1019  •  ibuprofen (ADVIL,MOTRIN) tablet 600 mg, 600 mg, Oral, Q4H PRN, Miguel Chaudhari DO, 600 mg at 12/02/20 1341  •  Magnesium Sulfate 2 gram Bolus, followed by 8 gram infusion (total Mg dose 10 grams)- Mg less than or  equal to 1mg/dL, 2 g, Intravenous, PRN **OR** Magnesium Sulfate 2 gram / 50mL Infusion (GIVE X 3 BAGS TO EQUAL 6GM TOTAL DOSE) - Mg 1.1 - 1.5 mg/dl, 2 g, Intravenous, PRN **OR** Magnesium Sulfate 4 gram infusion- Mg 1.6-1.9 mg/dL, 4 g, Intravenous, PRN, Marquis Gaitan MD, Stopped at 20 1350  •  morphine injection 2 mg, 2 mg, Intravenous, Q4H PRN, Mary Avila MD, 2 mg at 20 1230  •  ondansetron (ZOFRAN) injection 4 mg, 4 mg, Intravenous, Q6H PRN, Miguel Chaudhari DO, 4 mg at 20 1019  •  potassium chloride (MICRO-K) CR capsule 40 mEq, 40 mEq, Oral, PRN, 40 mEq at 20 1751 **OR** potassium chloride (KLOR-CON) packet 40 mEq, 40 mEq, Oral, PRN **OR** potassium chloride 10 mEq in 100 mL IVPB, 10 mEq, Intravenous, Q1H PRN, Marquis Gaitan MD  •  sodium chloride 0.9 % flush 10 mL, 10 mL, Intravenous, PRN, Miguel Chaudhari, , 10 mL at 20 1919  •  sodium chloride 0.9 % flush 10 mL, 10 mL, Intravenous, Q12H, Miguel Chaudhari, , 10 mL at 20 2049  •  sodium chloride 0.9 % flush 10 mL, 10 mL, Intravenous, PRN, Miguel Chaudhari, , 10 mL at 20 0749    Antibiotics:  Anti-Infectives (From admission, onward)    Ordered     Dose/Rate Route Frequency Start Stop    20 1503  cefTRIAXone (ROCEPHIN) 2 g/100 mL 0.9% NS IVPB (MBP)     Ordering Provider: Alexei Woodson MD    2 g  over 30 Minutes Intravenous Every 24 Hours 20 1600 20 1559    20 195  vancomycin 2250 mg/500 mL 0.9% NS IVPB ([facility])     Ordering Provider: Miguel Chaudhari DO    25 mg/kg × 88.5 kg  over 120 Minutes Intravenous Once 20  piperacillin-tazobactam (ZOSYN) 3.375 g in iso-osmotic dextrose 50 ml (premix)     Ordering Provider: Miguel Chaudhari DO    3.375 g  over 30 Minutes Intravenous Once 20            Review of Systems:  HPI    Physical Exam:   Vital Signs  Temp (24hrs), Av.8 °F (37.7 °C), Min:98 °F (36.7 °C), Max:102.6 °F  (39.2 °C)    Temp  Min: 98 °F (36.7 °C)  Max: 102.6 °F (39.2 °C)  BP  Min: 110/59  Max: 147/88  Pulse  Min: 80  Max: 120  Resp  Min: 16  Max: 20  SpO2  Min: 95 %  Max: 100 %    GENERAL: Awake and alert, appears deo comfortable today  Lucid  HEENT: Normocephalic, atraumatic.  PERRL. EOMI. No conjunctival injection. No icterus.       LUNGS: no distress, symmetrical expansion  ABDOMEN:  nondistended.    EXT:  No cyanosis, clubbing or edema. No cord.  :  Without Anne catheter.  MSK: No joint deformity  SKIN: Warm and dry without cutaneous eruptions on Inspection/palpation.    NEURO: Oriented to PPT.  PSYCHIATRIC: Normal insight and judgement. Cooperative with PE    Laboratory Data    Results from last 7 days   Lab Units 12/02/20  0536 12/01/20  0521 11/30/20  1130   WBC 10*3/mm3 5.02 3.66 5.01   HEMOGLOBIN g/dL 10.7* 10.1* 12.0   HEMATOCRIT % 33.0* 30.7* 35.9   PLATELETS 10*3/mm3 116* 114* 133*     Results from last 7 days   Lab Units 12/02/20  0536   SODIUM mmol/L 136   POTASSIUM mmol/L 4.0   CHLORIDE mmol/L 105   CO2 mmol/L 21.0*   BUN mg/dL 7   CREATININE mg/dL 0.60   GLUCOSE mg/dL 92   CALCIUM mg/dL 8.5*     Results from last 7 days   Lab Units 12/02/20  0536   ALK PHOS U/L 298*   BILIRUBIN mg/dL 1.0   ALT (SGPT) U/L 206*   AST (SGOT) U/L 128*             Results from last 7 days   Lab Units 12/01/20  0215   LACTATE mmol/L 1.9             Estimated Creatinine Clearance: 164.8 mL/min (by C-G formula based on SCr of 0.6 mg/dL).      Microbiology:  No results found for: ACANTHNAEG, AFBCX, BPERTUSSISCX, BLOODCX  No results found for: BCIDPCR, CXREFLEX, CSFCX, CULTURETIS  No results found for: CULTURES, HSVCX, URCX  No results found for: EYECULTURE, GCCX, LABHSV  No results found for: LEGIONELLA, MRSACX, MUMPSCX, MYCOPLASCX  No results found for: NOCARDIACX, STOOLCX  No results found for: THROATCX, UNSTIMCULT, URINECX, CULTURE, VZVCULTUR  No results found for: VIRALCULTU, WOUNDCX        Radiology:  Imaging Results  (Last 72 Hours)     Procedure Component Value Units Date/Time    MRI abdomen wo contrast mrcp [[id number]] Collected: 12/02/20 1215     Updated: 12/02/20 1223    Narrative:      EXAMINATION: MRI ABDOMEN WO CONTRAST MRCP-      INDICATION: Abdominal pain, biliary obstruction suspected (Ped 0-18y);  B34.9-Viral infection, unspecified; D72.825-Bandemia; R74.8-Abnormal  levels of other serum enzymes     TECHNIQUE: Multiplanar MRI of the abdomen without intravenous contrast  with MRCP sequencing        COMPARISON: CT abdomen pelvis 11/30/2020, ultrasound 11/30/2020     FINDINGS: Lung bases demonstrate atelectasis and reticular  opacifications without pleural effusion. Liver without focal lesion  specifically no T2 hyperintense focus of abnormality and no significant  signal dropout on opposed phase imaging. Gallbladder unremarkable  without filling defect. MRCP sequences including 3-D space  reconstruction reveals grossly normal caliber and contour of the  tapering common bile duct measuring 5 to 6 mm at maximum dimension to  the level the ampulla. Indeterminate for evaluation of pancreas divisum  due to motion and overall technique. Pancreas without focal lesion.  Spleen is mildly enlarged at 14 cm in craniocaudal dimension. No  ascites. No abnormal findings of the proximal visualized GI tract.  Adrenals without nodule. Kidneys without hydronephrosis or hydroureter.  No bulky retroperitoneal or upper abdominal adenopathy. No aggressive  bone marrow signal findings or acute soft tissue body wall findings.       Impression:      1. Lung bases demonstrate opacifications somewhat reticular in opacity  left greater than right concerning for atelectasis versus airspace  disease without pleural effusion  2. No focal liver lesion or abnormality of the biliary system with  motion degradation on imaging however no evidence for cholelithiasis or  biliary dilatation/irregularity. Previously identified cholelithiasis on  ultrasound  not clearly evident on the current exam  3. Mild splenomegaly at 14 cm craniocaudal dimension greater than  expected for normal without focal splenic lesion or perisplenic ascites          CT Abdomen Pelvis With Contrast [[id number]] Collected: 11/30/20 1820     Updated: 12/01/20 1304    Narrative:      EXAMINATION: CT ABDOMEN AND PELVIS W CONTRAST-      INDICATION: Left upper quadrant abdominal pain; fever; COVID-19  negative; vomiting today.      TECHNIQUE: CT abdomen and pelvis with intravenous contrast  administration.     The radiation dose reduction device was turned on for each scan per the  ALARA (As Low as Reasonably Achievable) protocol.     COMPARISON: None.     FINDINGS: Lung bases demonstrate atelectasis and/or scarring without  consolidation or effusion. Liver demonstrates homogeneity throughout  without focal liver lesion. Gallbladder is unremarkable. No biliary  dilatation. Pancreas is unremarkable. Spleen is enlarged to 14.5 cm of  indeterminate significance. Adrenals without distinct nodule. Kidneys  without hydronephrosis or hydroureter. No bulky retroperitoneal  adenopathy. Patent nonaneurysmal abdominal aorta. Celiac and SMA origins  are patent. GI tract evaluation without focal thickening or  disproportionate dilatation of bowel to suggest mechanical obstructive  process. Appendix visualized and unremarkable. No free fluid or  intraabdominal free air. Pelvic viscera demonstrates findings of likely  current menstruation with trace physiologically appropriate free fluid.  No suspicious adnexal lesion or loculated collection. No aggressive  osseous or soft tissue body wall findings.       Impression:      No mechanical obstructive process or loculated fluid  collection with appendix visualized and unremarkable. Redundancy of the  sigmoid colon without evidence for acute inflammation to suggest colitis  or diverticulitis. Current menstruation findings with physiologically  appropriate free fluid in  the pelvic cul-de-sac.     D:  11/30/2020  E:  12/01/2020     This report was finalized on 12/1/2020 1:00 PM by Dr. Stan Darby.       US Gallbladder [[id number]] Collected: 11/30/20 2207     Updated: 11/30/20 2209    Narrative:      US Abdomen Ltd    INDICATION:   Abnormal elevated liver enzymes. Abdominal pain with nausea and vomiting for one day.    COMPARISON:   CT abdomen same day    FINDINGS:  PANCREAS: Visualized portions of the pancreas are within normal limits.     LIVER: The echogenicity and echotexture of the hepatic parenchyma is within normal limits.  No hepatic mass. The intrahepatic bile ducts are normal in caliber. The common duct is normal in size at the reyna hepatis measuring 3 mm.    GALLBLADDER: No gallbladder wall thickening. There is an echogenic focus in the gallbladder neck that may reflect a gallstone or potentially tumefactive sludge. The gallbladder is otherwise normal.    RIGHT KIDNEY: The right kidney measures 10.0 cm. Renal cortical thickness and echogenicity is normal. No hydronephrosis.    OTHER: No ascites.      Impression:      Small gallstone in the gallbladder neck versus some tumefactive sludge. Otherwise normal right upper quadrant ultrasound.    Signer Name: Clinton Loyd MD   Signed: 11/30/2020 10:07 PM   Workstation Name: [facility]    [facility]    XR Chest 1 View [[id number]] Collected: 11/30/20 2147     Updated: 11/30/20 2149    Narrative:      PROCEDURE: CR Chest 1 Vw    COMPARISON:  No relevant comparison or correlation studies available at time of dictation.     INDICATIONS: fever; Viral infection, unspecified;Bandemia;Abnormal levels of other serum enzymes  Relevant clinical info: FEVER, NAUSEA, VOMITING X 1 WEEKS, DIFFICULTY BREATHING, COUGH    TECHNIQUE: Single AP  view of the chest    FINDINGS:  Cardiomediastinal silhouette is within normal limits given projection.  Lungs are relatively well inflated and clear. Osseous structures are  intact.      Impression:      No acute disease.         Signer Name: Jeanne Reece MD   Signed: 11/30/2020 9:47 PM   Workstation Name: [facility]    [facility]            Impression:   Infectious mononucleosis  Leukopenia resolved  Fever improving  Relatively low platelets improving  Atypical lymphocytosis  Transaminitis improving  Gallstone  Splenomegaly  Headache    PLAN/RECOMMENDATIONS:   Thank you for asking us to see Whitney North, I recommend the following:  Peripheral smear remarkable for atypical lymphocytosis, splenomegaly, fever, leukopenia, transaminitis all very suspicious for infectious mononucleosis.    Monospot can be negative.  EBV IgM is positive      Penicillin antibiotics such as ampicillin and amoxicillin have been known to precipitate rash in setting of mononucleosis.  Patient has had intermittent rash this admission thought to be from vancomycin/        D/c ceftriaxone     Supportive care  Can go home soon with symptoms are well tolerated    Patient may have fevers myalgias for weeks    Home when headache controlled    Supportive care for now  No specific therapy for EBV             Alexei Woodson MD  12/2/2020  15:03 EST

## 2020-12-03 LAB
ALBUMIN SERPL-MCNC: 3 G/DL (ref 3.5–5.2)
ALBUMIN/GLOB SERPL: 0.8 G/DL
ALP SERPL-CCNC: 355 U/L (ref 39–117)
ALT SERPL W P-5'-P-CCNC: 177 U/L (ref 1–33)
ANION GAP SERPL CALCULATED.3IONS-SCNC: 11 MMOL/L (ref 5–15)
AST SERPL-CCNC: 110 U/L (ref 1–32)
BASOPHILS # BLD MANUAL: 0 10*3/MM3 (ref 0–0.2)
BASOPHILS NFR BLD AUTO: 0 % (ref 0–1.5)
BILIRUB SERPL-MCNC: 0.7 MG/DL (ref 0–1.2)
BUN SERPL-MCNC: 7 MG/DL (ref 6–20)
BUN/CREAT SERPL: 11.5 (ref 7–25)
CALCIUM SPEC-SCNC: 8.4 MG/DL (ref 8.6–10.5)
CHLORIDE SERPL-SCNC: 102 MMOL/L (ref 98–107)
CMV IGM SERPL IA-ACNC: <30 AU/ML (ref 0–29.9)
CO2 SERPL-SCNC: 20 MMOL/L (ref 22–29)
CREAT SERPL-MCNC: 0.61 MG/DL (ref 0.57–1)
DEPRECATED RDW RBC AUTO: 42.3 FL (ref 37–54)
EOSINOPHIL # BLD MANUAL: 0 10*3/MM3 (ref 0–0.4)
EOSINOPHIL NFR BLD MANUAL: 0 % (ref 0.3–6.2)
ERYTHROCYTE [DISTWIDTH] IN BLOOD BY AUTOMATED COUNT: 13.4 % (ref 12.3–15.4)
GFR SERPL CREATININE-BSD FRML MDRD: 123 ML/MIN/1.73
GLOBULIN UR ELPH-MCNC: 3.8 GM/DL
GLUCOSE SERPL-MCNC: 89 MG/DL (ref 65–99)
HCT VFR BLD AUTO: 32.2 % (ref 34–46.6)
HGB BLD-MCNC: 10 G/DL (ref 12–15.9)
LYMPHOCYTES # BLD MANUAL: 2.93 10*3/MM3 (ref 0.7–3.1)
LYMPHOCYTES NFR BLD MANUAL: 46 % (ref 19.6–45.3)
LYMPHOCYTES NFR BLD MANUAL: 5 % (ref 5–12)
MCH RBC QN AUTO: 26.6 PG (ref 26.6–33)
MCHC RBC AUTO-ENTMCNC: 31.1 G/DL (ref 31.5–35.7)
MCV RBC AUTO: 85.6 FL (ref 79–97)
MONOCYTES # BLD AUTO: 0.32 10*3/MM3 (ref 0.1–0.9)
NEUTROPHILS # BLD AUTO: 2.68 10*3/MM3 (ref 1.7–7)
NEUTROPHILS NFR BLD MANUAL: 32 % (ref 42.7–76)
NEUTS BAND NFR BLD MANUAL: 10 % (ref 0–5)
NRBC SPEC MANUAL: 0 /100 WBC (ref 0–0.2)
PLAT MORPH BLD: NORMAL
PLATELET # BLD AUTO: 131 10*3/MM3 (ref 140–450)
PMV BLD AUTO: 12.6 FL (ref 6–12)
POTASSIUM SERPL-SCNC: 3.8 MMOL/L (ref 3.5–5.2)
PROT SERPL-MCNC: 6.8 G/DL (ref 6–8.5)
RBC # BLD AUTO: 3.76 10*6/MM3 (ref 3.77–5.28)
RBC MORPH BLD: NORMAL
SODIUM SERPL-SCNC: 133 MMOL/L (ref 136–145)
VARIANT LYMPHS NFR BLD MANUAL: 7 % (ref 0–5)
WBC # BLD AUTO: 6.37 10*3/MM3 (ref 3.4–10.8)
WBC MORPH BLD: NORMAL

## 2020-12-03 PROCEDURE — 63710000001 DIPHENHYDRAMINE PER 50 MG: Performed by: NURSE PRACTITIONER

## 2020-12-03 PROCEDURE — 80053 COMPREHEN METABOLIC PANEL: CPT | Performed by: INTERNAL MEDICINE

## 2020-12-03 PROCEDURE — 99225 PR SBSQ OBSERVATION CARE/DAY 25 MINUTES: CPT | Performed by: FAMILY MEDICINE

## 2020-12-03 PROCEDURE — 96372 THER/PROPH/DIAG INJ SC/IM: CPT

## 2020-12-03 PROCEDURE — 25010000002 ENOXAPARIN PER 10 MG: Performed by: INTERNAL MEDICINE

## 2020-12-03 PROCEDURE — G0378 HOSPITAL OBSERVATION PER HR: HCPCS

## 2020-12-03 PROCEDURE — 85025 COMPLETE CBC W/AUTO DIFF WBC: CPT | Performed by: INTERNAL MEDICINE

## 2020-12-03 PROCEDURE — 25010000002 KETOROLAC TROMETHAMINE PER 15 MG: Performed by: FAMILY MEDICINE

## 2020-12-03 PROCEDURE — 85007 BL SMEAR W/DIFF WBC COUNT: CPT | Performed by: INTERNAL MEDICINE

## 2020-12-03 PROCEDURE — 25010000002 MORPHINE PER 10 MG: Performed by: FAMILY MEDICINE

## 2020-12-03 PROCEDURE — 96376 TX/PRO/DX INJ SAME DRUG ADON: CPT

## 2020-12-03 RX ORDER — KETOROLAC TROMETHAMINE 30 MG/ML
30 INJECTION, SOLUTION INTRAMUSCULAR; INTRAVENOUS EVERY 6 HOURS PRN
Status: DISCONTINUED | OUTPATIENT
Start: 2020-12-03 | End: 2020-12-04 | Stop reason: HOSPADM

## 2020-12-03 RX ORDER — DIPHENHYDRAMINE HCL 25 MG
25 CAPSULE ORAL ONCE
Status: COMPLETED | OUTPATIENT
Start: 2020-12-03 | End: 2020-12-03

## 2020-12-03 RX ADMIN — DIPHENHYDRAMINE HYDROCHLORIDE 25 MG: 25 CAPSULE ORAL at 21:44

## 2020-12-03 RX ADMIN — ACETAMINOPHEN 650 MG: 325 TABLET, FILM COATED ORAL at 19:18

## 2020-12-03 RX ADMIN — BENZONATATE 100 MG: 100 CAPSULE ORAL at 09:52

## 2020-12-03 RX ADMIN — BUTALBITAL, ACETAMINOPHEN, AND CAFFEINE 2 TABLET: 50; 325; 40 TABLET ORAL at 03:36

## 2020-12-03 RX ADMIN — MORPHINE SULFATE 2 MG: 2 INJECTION, SOLUTION INTRAMUSCULAR; INTRAVENOUS at 09:52

## 2020-12-03 RX ADMIN — BUTALBITAL, ACETAMINOPHEN, AND CAFFEINE 2 TABLET: 50; 325; 40 TABLET ORAL at 20:07

## 2020-12-03 RX ADMIN — KETOROLAC TROMETHAMINE 30 MG: 30 INJECTION, SOLUTION INTRAMUSCULAR at 11:53

## 2020-12-03 RX ADMIN — ENOXAPARIN SODIUM 40 MG: 40 INJECTION SUBCUTANEOUS at 05:51

## 2020-12-03 RX ADMIN — MORPHINE SULFATE 2 MG: 2 INJECTION, SOLUTION INTRAMUSCULAR; INTRAVENOUS at 03:34

## 2020-12-03 RX ADMIN — SODIUM CHLORIDE, PRESERVATIVE FREE 10 ML: 5 INJECTION INTRAVENOUS at 21:00

## 2020-12-03 RX ADMIN — KETOROLAC TROMETHAMINE 30 MG: 30 INJECTION, SOLUTION INTRAMUSCULAR at 20:05

## 2020-12-03 RX ADMIN — BUTALBITAL, ACETAMINOPHEN, AND CAFFEINE 2 TABLET: 50; 325; 40 TABLET ORAL at 09:52

## 2020-12-03 RX ADMIN — BENZONATATE 100 MG: 100 CAPSULE ORAL at 19:19

## 2020-12-03 NOTE — PROGRESS NOTES
Continued Stay Note  [facility]     Patient Name: Whitney North  MRN: [id number]  Today's Date: 12/3/2020    Admit Date: 11/30/2020    Discharge Plan     Row Name 12/03/20 1125       Plan    Plan  Home    Patient/Family in Agreement with Plan  yes    Plan Comments  Discussed in rounds, pt pos for [facility]. Plan is home at d/c. No needs at this time. CM will follow. Baylee ext. [phone]        Discharge Codes    No documentation.       Expected Discharge Date and Time     Expected Discharge Date Expected Discharge Time    Dec 4, 2020             Baylee Tapia RN

## 2020-12-03 NOTE — PROGRESS NOTES
[facility] Medicine Services  PROGRESS NOTE    Patient Name: Whitney North  : 2000  MRN: [id number]    Date of Admission: 2020  Primary Care Physician: Vik Sandoval MD    Subjective   Subjective     CC:  Fever and headache    HPI:  Patient is a 20-year-old who presented with infectious mononucleosis.  She presented with fever of uncertain etiology.      2020-Dr. Woodson with infectious diseases been consulted and following.  He feels her fever is most likely secondary to a viral infection, likely mono.  Her work-up has been negative thus far.  She did have concern for gallstone on ultrasound of the right upper quadrant and an MRCP was ordered which was negative.  She complains of headache with neck pain.  Will try Tylenol, ibuprofen, morphine and Fioricet as needed.  She also complains of left lower quadrant pain intermittently.  Her last menstrual period was about 9 days ago.    12/3/2020-patient reports improvement in her headache with the Fioricet yesterday.  She continues to complain of left lower quadrant pain and feels her headache is coming back.  Her EBV IgM titer was high.  She is tolerating p.o. but has a decreased appetite.  Discussed her care with Dr. Woodson.    Review of Systems  General: Positive fever, chills, fatigue  ENT: No sore throat, trouble swallowing or changes in vision  Respiratory: No shortness of breath, positive cough, denies wheezing or fast breathing  Cardiovascular: No chest pain, palpitations, dyspnea with exertion  Gastrointestinal: Positive nausea vomiting and abdominal pain  Musculoskeletal: No difficulty walking, no weakness  Vascular: No cyanosis or clubbing  Lymphatic: No peripheral edema, no lymphadenopathy  Neurologic: Positive headache, denies confusion, dizziness  Psychiatric: Positive anxiety.       Objective   Objective     Vital Signs:   Temp:  [98.1 °F (36.7 °C)-100.4 °F (38 °C)] 100.4 °F (38 °C)  Heart Rate:  []  100  Resp:  [18] 18  BP: (121-149)/(55-87) 149/55        Physical Exam:  Constitutional: No acute distress, awake, alert, nontoxic, overweight body habitus  Eyes: Pupils equal, sclerae anicteric, no conjunctival injection  HENT: NCAT, mucous membranes moist  Neck: Supple, no thyromegaly, no lymphadenopathy  Respiratory: Clear to auscultation bilaterally, good effort, nonlabored respirations   Cardiovascular: RRR  Gastrointestinal: Positive bowel sounds, soft, tender in the right upper quadrant and left lower quadrant, nondistended  Musculoskeletal: No peripheral edema, normal muscle tone for age  Psychiatric: Appropriate affect, good insight and judgement, cooperative  Neurologic: Oriented x 3, movements symmetric BUE and BLE, Cranial Nerves grossly intact, speech clear and fluent    Results Reviewed:  Results from last 7 days   Lab Units 12/03/20  0553 12/02/20  0536 12/01/20 0521   WBC 10*3/mm3 6.37 5.02 3.66   HEMOGLOBIN g/dL 10.0* 10.7* 10.1*   HEMATOCRIT % 32.2* 33.0* 30.7*   PLATELETS 10*3/mm3 131* 116* 114*   INR   --  1.21*  --      Results from last 7 days   Lab Units 12/03/20  0553 12/02/20  0536 12/01/20 0521 11/30/20  1130   SODIUM mmol/L 133* 136 137 134*   POTASSIUM mmol/L 3.8 4.0 3.1* 3.4*   CHLORIDE mmol/L 102 105 105 102   CO2 mmol/L 20.0* 21.0* 20.0* 19.0*   BUN mg/dL 7 7 5* 8   CREATININE mg/dL 0.61 0.60 0.69 0.73   GLUCOSE mg/dL 89 92 112* 111*   CALCIUM mg/dL 8.4* 8.5* 7.9* 8.6   ALT (SGPT) U/L 177* 206*  --  211*   AST (SGOT) U/L 110* 128*  --  179*     Estimated Creatinine Clearance: 162.1 mL/min (by C-G formula based on SCr of 0.61 mg/dL).    Microbiology Results Abnormal     Procedure Component Value - Date/Time    Blood Culture - Blood, Hand, Left [[id number]] Collected: 11/30/20 2031    Lab Status: Preliminary result Specimen: Blood from Hand, Left Updated: 12/02/20 2130     Blood Culture No growth at 2 days    Blood Culture - Blood, Hand, Right [[id number]] Collected: 11/30/20 2025    Lab  Status: Preliminary result Specimen: Blood from Hand, Right Updated: 12/02/20 2130     Blood Culture No growth at 2 days    Blood Culture - Blood, Hand, Left [[id number]] Collected: 11/30/20 1120    Lab Status: Preliminary result Specimen: Blood from Hand, Left Updated: 12/02/20 1245     Blood Culture No growth at 2 days    Legionella Antigen, Urine - Urine, Urine, Clean Catch [[id number]]  (Normal) Collected: 11/30/20 1637    Lab Status: Final result Specimen: Urine, Clean Catch Updated: 12/01/20 0300     LEGIONELLA ANTIGEN, URINE Negative    COVID-19 PCR, [facility], NP SWAB IN [facility] TRANSPORT MEDIA 24-30 HR TAT - Swab, Nasopharynx [[id number]] Collected: 11/30/20 1251    Lab Status: Final result Specimen: Swab from Nasopharynx Updated: 11/30/20 1714     SARS-CoV-2 VICTORIA Not Detected    Respiratory Panel PCR w/COVID-19(SARS-CoV-2) [facility] In-House, NP Swab in UTM/VTM, 3-4 HR TAT - Swab, Nasopharynx [[id number]]  (Normal) Collected: 11/30/20 1509    Lab Status: Final result Specimen: Swab from Nasopharynx Updated: 11/30/20 1623     ADENOVIRUS, PCR Not Detected     Coronavirus 229E Not Detected     Coronavirus HKU1 Not Detected     Coronavirus NL63 Not Detected     Coronavirus OC43 Not Detected     COVID19 Not Detected     Human Metapneumovirus Not Detected     Human Rhinovirus/Enterovirus Not Detected     Influenza A PCR Not Detected     Influenza A H1 Not Detected     Influenza A H1 2009 PCR Not Detected     Influenza A H3 Not Detected     Influenza B PCR Not Detected     Parainfluenza Virus 1 Not Detected     Parainfluenza Virus 2 Not Detected     Parainfluenza Virus 3 Not Detected     Parainfluenza Virus 4 Not Detected     RSV, PCR Not Detected     Bordetella pertussis pcr Not Detected     Bordetella parapertussis PCR Not Detected     Chlamydophila pneumoniae PCR Not Detected     Mycoplasma pneumo by PCR Not Detected    Narrative:      Fact sheet for providers:  https://docs.[employer]/wp-content/uploads/FZO4799-9369-VT4.1-EUA-Provider-Fact-Sheet-3.pdf    Fact sheet for patients: https://docs.[employer]/wp-content/uploads/PZC0694-8956-XT7.1-EUA-Patient-Fact-Sheet-1.pdf          Imaging Results (Last 24 Hours)     Procedure Component Value Units Date/Time    MRI abdomen wo contrast mrcp [[id number]] Collected: 12/02/20 1215     Updated: 12/02/20 2136    Narrative:      EXAMINATION: MRI ABDOMEN WO CONTRAST MRCP - 12/02/2020     INDICATION: B34.9-Viral infection, unspecified; D72.825-Bandemia;  R74.8-Abnormal levels of other serum enzymes. Abdominal pain.     TECHNIQUE: Multiplanar MRI of the abdomen without intravenous contrast  with MRCP sequencing        COMPARISON: CT abdomen and pelvis 11/30/2020, ultrasound 11/30/2020.     FINDINGS: Lung bases demonstrate atelectasis and reticular  opacifications without pleural effusion. Liver without focal lesion  specifically no T2 hyperintense focus of abnormality and no significant  signal dropout on opposed phase imaging. Gallbladder unremarkable  without filling defect. MRCP sequences including 3D space reconstruction  reveals grossly normal caliber and contour of the tapering common bile  duct measuring 5-6 mm at maximum dimension to the level of the ampulla,  indeterminate for evaluation of pancreas divisum due to motion and  overall technique. Pancreas without focal lesion. Spleen is mildly  enlarged at 14 cm in craniocaudal dimension. No ascites. No abnormal  findings of the proximal visualized GI tract. Adrenals without nodule.  Kidneys without hydronephrosis or hydroureter. No bulky retroperitoneal  or upper abdominal adenopathy. No aggressive bone marrow signal findings  or acute soft tissue body wall findings.       Impression:      1. Lung bases demonstrate opacifications somewhat reticular in opacity  left greater than right concerning for atelectasis versus airspace  disease without pleural effusion.     2. No  focal liver lesion or abnormality of the biliary system with  motion degradation on imaging, however, no evidence for cholelithiasis  or biliary dilatation/irregularity. Previously identified cholelithiasis  on ultrasound not clearly evident on the current exam.     3. Mild splenomegaly at 14 cm craniocaudal dimension greater than  expected for normal without focal splenic lesion or perisplenic ascites.     DICTATED:   12/02/2020  EDITED/ls :   12/02/2020                      I have reviewed the medications:  Scheduled Meds:sodium chloride, 10 mL, Intravenous, Q12H      Continuous Infusions:   PRN Meds:.•  acetaminophen **OR** acetaminophen **OR** acetaminophen  •  benzonatate  •  butalbital-acetaminophen-caffeine  •  ibuprofen  •  ketorolac  •  magnesium sulfate **OR** magnesium sulfate **OR** magnesium sulfate  •  ondansetron  •  potassium chloride **OR** potassium chloride **OR** potassium chloride  •  sodium chloride  •  sodium chloride    Assessment/Plan   Assessment & Plan     Active Hospital Problems    Diagnosis  POA   • **SIRS (systemic inflammatory response syndrome) (CMS/HCC) [R65.10]  Unknown   • Fever [R50.9]  Yes   • Systemic viral illness [B34.9]  Yes   • Transaminitis [R74.01]  Unknown      Resolved Hospital Problems   No resolved problems to display.        Brief Hospital Course to date:  Whitney North is a 20 y.o. female who is admitted with infectious mononucleosis with symptoms of fever, headache and abdominal pain.  Infectious diseases consulted.    SIRS  Fever  Infectious mononucleosis  Splenomegaly  -Consulted infectious disease, Dr. Woodson follows  -Continue supportive care with antipyretics and anti-inflammatories as needed    Headache  -Improved with Fioricet  -We will treat supportively with pain meds as needed    Elevated liver enzymes  -We will continue to trend, mild    Mild anemia  -Continue to trend, hemoglobin 10.7    Cholelithiasis seen on ultrasound of the right upper quadrant  -MRCP  was negative for stones or ductal dilation    DVT Prophylaxis: Lovenox      Disposition: I expect the patient to be discharged when she is afebrile for 24 hours.    CODE STATUS:   Code Status and Medical Interventions:   Ordered at: 11/30/20 6979     Level Of Support Discussed With:    Patient     Code Status:    CPR     Medical Interventions (Level of Support Prior to Arrest):    Full       Mary Avila MD  12/03/20

## 2020-12-04 VITALS
BODY MASS INDEX: 31.37 KG/M2 | DIASTOLIC BLOOD PRESSURE: 83 MMHG | TEMPERATURE: 98.5 F | WEIGHT: 195.2 LBS | HEART RATE: 85 BPM | OXYGEN SATURATION: 99 % | HEIGHT: 66 IN | RESPIRATION RATE: 18 BRPM | SYSTOLIC BLOOD PRESSURE: 136 MMHG

## 2020-12-04 PROBLEM — B27.90 INFECTIOUS MONONUCLEOSIS: Status: ACTIVE | Noted: 2020-12-04

## 2020-12-04 PROBLEM — R16.1 SPLENOMEGALY: Status: ACTIVE | Noted: 2020-12-04

## 2020-12-04 LAB
ALBUMIN SERPL-MCNC: 3.2 G/DL (ref 3.5–5.2)
ALBUMIN/GLOB SERPL: 0.8 G/DL
ALP SERPL-CCNC: 405 U/L (ref 39–117)
ALT SERPL W P-5'-P-CCNC: 177 U/L (ref 1–33)
ANION GAP SERPL CALCULATED.3IONS-SCNC: 8 MMOL/L (ref 5–15)
AST SERPL-CCNC: 115 U/L (ref 1–32)
BASOPHILS # BLD MANUAL: 0.08 10*3/MM3 (ref 0–0.2)
BASOPHILS NFR BLD AUTO: 1 % (ref 0–1.5)
BILIRUB SERPL-MCNC: 0.6 MG/DL (ref 0–1.2)
BUN SERPL-MCNC: 5 MG/DL (ref 6–20)
BUN/CREAT SERPL: 7.1 (ref 7–25)
CALCIUM SPEC-SCNC: 8.5 MG/DL (ref 8.6–10.5)
CHLORIDE SERPL-SCNC: 103 MMOL/L (ref 98–107)
CO2 SERPL-SCNC: 24 MMOL/L (ref 22–29)
CREAT SERPL-MCNC: 0.7 MG/DL (ref 0.57–1)
DEPRECATED RDW RBC AUTO: 40.9 FL (ref 37–54)
EOSINOPHIL # BLD MANUAL: 0.08 10*3/MM3 (ref 0–0.4)
EOSINOPHIL NFR BLD MANUAL: 1 % (ref 0.3–6.2)
ERYTHROCYTE [DISTWIDTH] IN BLOOD BY AUTOMATED COUNT: 13.3 % (ref 12.3–15.4)
GFR SERPL CREATININE-BSD FRML MDRD: 105 ML/MIN/1.73
GLOBULIN UR ELPH-MCNC: 3.9 GM/DL
GLUCOSE SERPL-MCNC: 88 MG/DL (ref 65–99)
HCT VFR BLD AUTO: 34 % (ref 34–46.6)
HGB BLD-MCNC: 10.6 G/DL (ref 12–15.9)
LYMPHOCYTES # BLD MANUAL: 3.12 10*3/MM3 (ref 0.7–3.1)
LYMPHOCYTES NFR BLD MANUAL: 40 % (ref 19.6–45.3)
LYMPHOCYTES NFR BLD MANUAL: 5 % (ref 5–12)
MCH RBC QN AUTO: 26.2 PG (ref 26.6–33)
MCHC RBC AUTO-ENTMCNC: 31.2 G/DL (ref 31.5–35.7)
MCV RBC AUTO: 84.2 FL (ref 79–97)
METAMYELOCYTES NFR BLD MANUAL: 2 % (ref 0–0)
MONOCYTES # BLD AUTO: 0.39 10*3/MM3 (ref 0.1–0.9)
NEUTROPHILS # BLD AUTO: 3.19 10*3/MM3 (ref 1.7–7)
NEUTROPHILS NFR BLD MANUAL: 37 % (ref 42.7–76)
NEUTS BAND NFR BLD MANUAL: 4 % (ref 0–5)
PLAT MORPH BLD: NORMAL
PLATELET # BLD AUTO: 172 10*3/MM3 (ref 140–450)
PMV BLD AUTO: 11.8 FL (ref 6–12)
POTASSIUM SERPL-SCNC: 4.2 MMOL/L (ref 3.5–5.2)
PROT SERPL-MCNC: 7.1 G/DL (ref 6–8.5)
RBC # BLD AUTO: 4.04 10*6/MM3 (ref 3.77–5.28)
RBC MORPH BLD: NORMAL
SODIUM SERPL-SCNC: 135 MMOL/L (ref 136–145)
VARIANT LYMPHS NFR BLD MANUAL: 10 % (ref 0–5)
WBC # BLD AUTO: 7.79 10*3/MM3 (ref 3.4–10.8)
WBC MORPH BLD: NORMAL

## 2020-12-04 PROCEDURE — 85025 COMPLETE CBC W/AUTO DIFF WBC: CPT | Performed by: INTERNAL MEDICINE

## 2020-12-04 PROCEDURE — 99217 PR OBSERVATION CARE DISCHARGE MANAGEMENT: CPT | Performed by: FAMILY MEDICINE

## 2020-12-04 PROCEDURE — 85007 BL SMEAR W/DIFF WBC COUNT: CPT | Performed by: INTERNAL MEDICINE

## 2020-12-04 PROCEDURE — G0378 HOSPITAL OBSERVATION PER HR: HCPCS

## 2020-12-04 PROCEDURE — 80053 COMPREHEN METABOLIC PANEL: CPT | Performed by: INTERNAL MEDICINE

## 2020-12-04 PROCEDURE — 63710000001 DIPHENHYDRAMINE PER 50 MG: Performed by: FAMILY MEDICINE

## 2020-12-04 RX ORDER — DIPHENHYDRAMINE HCL 25 MG
25 CAPSULE ORAL 3 TIMES DAILY PRN
Status: DISCONTINUED | OUTPATIENT
Start: 2020-12-04 | End: 2020-12-04 | Stop reason: HOSPADM

## 2020-12-04 RX ADMIN — BENZONATATE 100 MG: 100 CAPSULE ORAL at 05:29

## 2020-12-04 RX ADMIN — ACETAMINOPHEN 650 MG: 325 TABLET, FILM COATED ORAL at 05:28

## 2020-12-04 RX ADMIN — DIPHENHYDRAMINE HYDROCHLORIDE 25 MG: 25 CAPSULE ORAL at 08:41

## 2020-12-04 RX ADMIN — SODIUM CHLORIDE, PRESERVATIVE FREE 10 ML: 5 INJECTION INTRAVENOUS at 08:41

## 2020-12-04 NOTE — DISCHARGE SUMMARY
[facility] Medicine Services  DISCHARGE SUMMARY    Patient Name: Whitney North  : 2000  MRN: [id number]    Date of Admission: 2020 11:15 AM  Date of Discharge:  20    Primary Care Physician: Vik Sandoval MD    Consults     Date and Time Order Name Status Description    2020 Inpatient Infectious Diseases Consult Completed           Hospital Course     Presenting Problem:   Systemic viral illness [B34.9]  Systemic viral illness [B34.9]  SIRS (systemic inflammatory response syndrome) (CMS/HCC) [R65.10]    Active Hospital Problems    Diagnosis  POA   • **Infectious mononucleosis [B27.90]  Yes   • Splenomegaly [R16.1]  Yes   • Fever [R50.9]  Yes   • Systemic viral illness [B34.9]  Yes   • Transaminitis [R74.01]  Unknown   • SIRS (systemic inflammatory response syndrome) (CMS/HCC) [R65.10]  Unknown      Resolved Hospital Problems   No resolved problems to display.          Hospital Course:  Whitney North is a 20 y.o. female who is admitted with infectious mononucleosis with symptoms of fever, headache and abdominal pain.  Infectious diseases consulted.     SIRS  Fever  Infectious mononucleosis  Splenomegaly  -Consulted infectious disease, Dr. Woodson who will follow the patient as an outpatient after discharge  -Continue supportive care with antipyretics and anti-inflammatories as needed  -No specific therapy for mono indicated    Positive blood culture from 2020, gram-positive bacilli  -Most likely contaminant, repeat blood cultures were negative x2  -She will follow-up with Dr. Woodson in clinic, I discussed this with him.     Headache  -Improved with Fioricet while admitted  -We will treat supportively with pain meds as needed at home     Elevated liver enzymes  -mild, likely related to mono     Mild anemia  -Likely related to mono, hemoglobin 10.7     Cholelithiasis seen on ultrasound of the right upper quadrant  -MRCP was negative for stones or ductal  dilation  -Advise she may have ultrasound repeated as an outpatient      Discharge Follow Up Recommendations for outpatient labs/diagnostics:  Follow-up with PCP in 2 weeks to discuss mono symptoms  Follow-up with Dr. Woodson in infectious disease clinic next week to discuss fevers and symptoms.    Day of Discharge     HPI:   Patient had a fever yesterday of 102 but she feels better overall.  Her headache is improved with the pain medications.  She would like to go home today.  She is tolerating p.o. well.  She did have an incidental finding of positive blood culture on 11/30/2020 which is gram-positive bacilli, discussed this with Dr. Woodson and he feels it is contaminant.  Patient is also aware of this.  She will follow up with Dr. Woodson next week.    Review of Systems  Positive fever headache abdominal pain nausea; denies chest pain shortness of breath    Vital Signs:   Temp:  [97.6 °F (36.4 °C)-102.4 °F (39.1 °C)] 98.5 °F (36.9 °C)  Heart Rate:  [] 85  Resp:  [15-18] 18  BP: (121-138)/(66-91) 136/83     Physical Exam:  Constitutional: No acute distress, awake, alert, nontoxic, overweight body habitus  Eyes: Pupils equal, sclerae anicteric, no conjunctival injection  HENT: NCAT, mucous membranes moist  Neck: Supple, no thyromegaly, no lymphadenopathy  Respiratory: Clear to auscultation bilaterally, good effort, nonlabored respirations   Cardiovascular: RRR  Gastrointestinal: Positive bowel sounds, soft, tender in the right upper quadrant and left lower quadrant, nondistended  Musculoskeletal: No peripheral edema, normal muscle tone for age  Psychiatric: Appropriate affect, good insight and judgement, cooperative  Neurologic: Oriented x 3, movements symmetric BUE and BLE, Cranial Nerves grossly intact, speech clear and fluent    Pertinent  and/or Most Recent Results     Results from last 7 days   Lab Units 12/04/20  0513 12/03/20  0553 12/02/20  0536 12/01/20  0521 11/30/20  1130   WBC 10*3/mm3 7.79 6.37 5.02 3.66  5.01   HEMOGLOBIN g/dL 10.6* 10.0* 10.7* 10.1* 12.0   HEMATOCRIT % 34.0 32.2* 33.0* 30.7* 35.9   PLATELETS 10*3/mm3 172 131* 116* 114* 133*   SODIUM mmol/L 135* 133* 136 137 134*   POTASSIUM mmol/L 4.2 3.8 4.0 3.1* 3.4*   CHLORIDE mmol/L 103 102 105 105 102   CO2 mmol/L 24.0 20.0* 21.0* 20.0* 19.0*   BUN mg/dL 5* 7 7 5* 8   CREATININE mg/dL 0.70 0.61 0.60 0.69 0.73   GLUCOSE mg/dL 88 89 92 112* 111*   CALCIUM mg/dL 8.5* 8.4* 8.5* 7.9* 8.6     Results from last 7 days   Lab Units 12/04/20  0513 12/03/20  0553 12/02/20  0536 11/30/20  1130   BILIRUBIN mg/dL 0.6 0.7 1.0 0.6   ALK PHOS U/L 405* 355* 298* 233*   ALT (SGPT) U/L 177* 177* 206* 211*   AST (SGOT) U/L 115* 110* 128* 179*   PROTIME Seconds  --   --  15.0*  --    INR   --   --  1.21*  --            Invalid input(s): TG  Results from last 7 days   Lab Units 12/01/20  0215 11/30/20 2027 11/30/20  1130   LACTATE mmol/L 1.9 2.4* 2.0       Brief Urine Lab Results  (Last result in the past 365 days)      Color   Clarity   Blood   Leuk Est   Nitrite   Protein   CREAT   Urine HCG        11/30/20 1637               Negative     11/30/20 1637 Yellow Clear Negative Negative Negative Negative               Microbiology Results Abnormal     Procedure Component Value - Date/Time    Blood Culture - Blood, Hand, Left [[id number]]  (Abnormal) Collected: 11/30/20 1120    Lab Status: Preliminary result Specimen: Blood from Hand, Left Updated: 12/04/20 0951     Blood Culture Abnormal Stain     Gram Stain Anaerobic Bottle Gram positive bacilli    Narrative:      Blood culture does not meet the specified criteria for PCR identification.  If pregnant, immunocompromised, or clinical concern for meningitis, call lab to run BCID for Listeria monocytogenes.    Blood Culture - Blood, Hand, Left [[id number]] Collected: 11/30/20 2031    Lab Status: Preliminary result Specimen: Blood from Hand, Left Updated: 12/03/20 2130     Blood Culture No growth at 3 days    Blood Culture - Blood, Hand,  Right [[id number]] Collected: 11/30/20 2025    Lab Status: Preliminary result Specimen: Blood from Hand, Right Updated: 12/03/20 2130     Blood Culture No growth at 3 days    Legionella Antigen, Urine - Urine, Urine, Clean Catch [[id number]]  (Normal) Collected: 11/30/20 1637    Lab Status: Final result Specimen: Urine, Clean Catch Updated: 12/01/20 0300     LEGIONELLA ANTIGEN, URINE Negative    COVID-19 PCR, [facility], NP SWAB IN [facility] V[facility]AL TRANSPORT MEDIA 24-30 HR TAT - Swab, Nasopharynx [[id number]] Collected: 11/30/20 1251    Lab Status: Final result Specimen: Swab from Nasopharynx Updated: 11/30/20 1714     SARS-CoV-2 VICTORIA Not Detected    Respiratory Panel PCR w/COVID-19(SARS-CoV-2) [facility] In-House, NP Swab in UTM/VTM, 3-4 HR TAT - Swab, Nasopharynx [[id number]]  (Normal) Collected: 11/30/20 1509    Lab Status: Final result Specimen: Swab from Nasopharynx Updated: 11/30/20 1623     ADENOVIRUS, PCR Not Detected     Coronavirus 229E Not Detected     Coronavirus HKU1 Not Detected     Coronavirus NL63 Not Detected     Coronavirus OC43 Not Detected     COVID19 Not Detected     Human Metapneumovirus Not Detected     Human Rhinovirus/Enterovirus Not Detected     Influenza A PCR Not Detected     Influenza A H1 Not Detected     Influenza A H1 2009 PCR Not Detected     Influenza A H3 Not Detected     Influenza B PCR Not Detected     Parainfluenza Virus 1 Not Detected     Parainfluenza Virus 2 Not Detected     Parainfluenza Virus 3 Not Detected     Parainfluenza Virus 4 Not Detected     RSV, PCR Not Detected     Bordetella pertussis pcr Not Detected     Bordetella parapertussis PCR Not Detected     Chlamydophila pneumoniae PCR Not Detected     Mycoplasma pneumo by PCR Not Detected    Narrative:      Fact sheet for providers: https://docs.[employer]/wp-content/uploads/HTH5263-9976-FX9.1-EUA-Provider-Fact-Sheet-3.pdf    Fact sheet for patients:  https://docs.[employer]/wp-content/uploads/DQL2592-2141-PQ8.1-EUA-Patient-Fact-Sheet-1.pdf          Imaging Results (All)     Procedure Component Value Units Date/Time    MRI abdomen wo contrast mrcp [[id number]] Collected: 12/02/20 1215     Updated: 12/03/20 1805    Narrative:      EXAMINATION: MRI ABDOMEN WO CONTRAST MRCP - 12/02/2020     INDICATION: B34.9-Viral infection, unspecified; D72.825-Bandemia;  R74.8-Abnormal levels of other serum enzymes. Abdominal pain.     TECHNIQUE: Multiplanar MRI of the abdomen without intravenous contrast  with MRCP sequencing        COMPARISON: CT abdomen and pelvis 11/30/2020, ultrasound 11/30/2020.     FINDINGS: Lung bases demonstrate atelectasis and reticular  opacifications without pleural effusion. Liver without focal lesion  specifically no T2 hyperintense focus of abnormality and no significant  signal dropout on opposed phase imaging. Gallbladder unremarkable  without filling defect. MRCP sequences including 3D space reconstruction  reveals grossly normal caliber and contour of the tapering common bile  duct measuring 5-6 mm at maximum dimension to the level of the ampulla,  indeterminate for evaluation of pancreas divisum due to motion and  overall technique. Pancreas without focal lesion. Spleen is mildly  enlarged at 14 cm in craniocaudal dimension. No ascites. No abnormal  findings of the proximal visualized GI tract. Adrenals without nodule.  Kidneys without hydronephrosis or hydroureter. No bulky retroperitoneal  or upper abdominal adenopathy. No aggressive bone marrow signal findings  or acute soft tissue body wall findings.       Impression:      1. Lung bases demonstrate opacifications somewhat reticular in opacity  left greater than right concerning for atelectasis versus airspace  disease without pleural effusion.     2. No focal liver lesion or abnormality of the biliary system with  motion degradation on imaging, however, no evidence for cholelithiasis  or  biliary dilatation/irregularity. Previously identified cholelithiasis  on ultrasound not clearly evident on the current exam.     3. Mild splenomegaly at 14 cm craniocaudal dimension greater than  expected for normal without focal splenic lesion or perisplenic ascites.     DICTATED:   12/02/2020  EDITED/ls :   12/02/2020         This report was finalized on 12/3/2020 6:02 PM by Dr. Stan Darby.       CT Abdomen Pelvis With Contrast [[id number]] Collected: 11/30/20 1820     Updated: 12/01/20 1304    Narrative:      EXAMINATION: CT ABDOMEN AND PELVIS W CONTRAST-      INDICATION: Left upper quadrant abdominal pain; fever; COVID-19  negative; vomiting today.      TECHNIQUE: CT abdomen and pelvis with intravenous contrast  administration.     The radiation dose reduction device was turned on for each scan per the  ALARA (As Low as Reasonably Achievable) protocol.     COMPARISON: None.     FINDINGS: Lung bases demonstrate atelectasis and/or scarring without  consolidation or effusion. Liver demonstrates homogeneity throughout  without focal liver lesion. Gallbladder is unremarkable. No biliary  dilatation. Pancreas is unremarkable. Spleen is enlarged to 14.5 cm of  indeterminate significance. Adrenals without distinct nodule. Kidneys  without hydronephrosis or hydroureter. No bulky retroperitoneal  adenopathy. Patent nonaneurysmal abdominal aorta. Celiac and SMA origins  are patent. GI tract evaluation without focal thickening or  disproportionate dilatation of bowel to suggest mechanical obstructive  process. Appendix visualized and unremarkable. No free fluid or  intraabdominal free air. Pelvic viscera demonstrates findings of likely  current menstruation with trace physiologically appropriate free fluid.  No suspicious adnexal lesion or loculated collection. No aggressive  osseous or soft tissue body wall findings.       Impression:      No mechanical obstructive process or loculated fluid  collection with appendix  visualized and unremarkable. Redundancy of the  sigmoid colon without evidence for acute inflammation to suggest colitis  or diverticulitis. Current menstruation findings with physiologically  appropriate free fluid in the pelvic cul-de-sac.     D:  11/30/2020  E:  12/01/2020     This report was finalized on 12/1/2020 1:00 PM by Dr. Stan Darby.       US Gallbladder [[id number]] Collected: 11/30/20 2207     Updated: 11/30/20 2209    Narrative:      US Abdomen Ltd    INDICATION:   Abnormal elevated liver enzymes. Abdominal pain with nausea and vomiting for one day.    COMPARISON:   CT abdomen same day    FINDINGS:  PANCREAS: Visualized portions of the pancreas are within normal limits.     LIVER: The echogenicity and echotexture of the hepatic parenchyma is within normal limits.  No hepatic mass. The intrahepatic bile ducts are normal in caliber. The common duct is normal in size at the reyna hepatis measuring 3 mm.    GALLBLADDER: No gallbladder wall thickening. There is an echogenic focus in the gallbladder neck that may reflect a gallstone or potentially tumefactive sludge. The gallbladder is otherwise normal.    RIGHT KIDNEY: The right kidney measures 10.0 cm. Renal cortical thickness and echogenicity is normal. No hydronephrosis.    OTHER: No ascites.      Impression:      Small gallstone in the gallbladder neck versus some tumefactive sludge. Otherwise normal right upper quadrant ultrasound.    Signer Name: Clinton Loyd MD   Signed: 11/30/2020 10:07 PM   Workstation Name: [facility]    [facility]    XR Chest 1 View [[id number]] Collected: 11/30/20 2147     Updated: 11/30/20 2149    Narrative:      PROCEDURE: CR Chest 1 Vw    COMPARISON:  No relevant comparison or correlation studies available at time of dictation.     INDICATIONS: fever; Viral infection, unspecified;Bandemia;Abnormal levels of other serum enzymes  Relevant clinical info: FEVER, NAUSEA, VOMITING X 1 WEEKS, DIFFICULTY  BREATHING, COUGH    TECHNIQUE: Single AP  view of the chest    FINDINGS:  Cardiomediastinal silhouette is within normal limits given projection.  Lungs are relatively well inflated and clear. Osseous structures are intact.      Impression:      No acute disease.         Signer Name: Jeanne Reece MD   Signed: 11/30/2020 9:47 PM   Workstation Name: [facility]    [facility] [place]                         Plan for Follow-up of Pending Labs/Results: Final blood culture results are pending at the time of discharge she will follow-up with Dr. Woodson for these.  Pending Labs     Order Current Status    Blood Culture - Blood, Hand, Left Preliminary result    Blood Culture - Blood, Hand, Left Preliminary result    Blood Culture - Blood, Hand, Right Preliminary result        Discharge Details        Discharge Medications      Continue These Medications      Instructions Start Date   Epiduo 0.1-2.5 % gel  Generic drug: Adapalene-Benzoyl Peroxide   As Needed      multivitamin with minerals tablet tablet   1 tablet, Oral, Daily      promethazine-dextromethorphan 6.25-15 MG/5ML syrup  Commonly known as: PROMETHAZINE-DM   Take 5 ml QID PRN cough/congestion         Stop These Medications    cephalexin 500 MG capsule  Commonly known as: KEFLEX     minocycline 100 MG capsule  Commonly known as: MINOCIN,DYNACIN            No Known Allergies      Discharge Disposition:  Home or Self Care    Diet:  Hospital:  Diet Order   Procedures   • Diet Regular       Activity:  Activity Instructions     Activity as Tolerated            Restrictions or Other Recommendations:  Due to the COVID-19 pandemic please practice safe physical distancing, wear a mask when you are out in public and wash your hands often.       CODE STATUS:    Code Status and Medical Interventions:   Ordered at: 11/30/20 6149     Level Of Support Discussed With:    Patient     Code Status:    CPR     Medical Interventions (Level of Support Prior to Arrest):     Full       No future appointments.    Additional Instructions for the Follow-ups that You Need to Schedule     Discharge Follow-up with PCP   As directed       Currently Documented PCP:    Vik Sandoval MD    PCP Phone Number:    [phone]     Follow Up Details: 2 weeks to discuss mono symptoms         Discharge Follow-up with Specified Provider: Dr. Kandice eduardo next week for mono follow-up; 4 Days   As directed      To: Dr. Kandice eduardo next week for mono follow-up    Follow Up: 4 Days                     Mary Avila MD  12/04/20      Time Spent on Discharge:  I spent  40  minutes on this discharge activity which included: face-to-face encounter with the patient, reviewing the data in the system, coordination of the care with the nursing staff as well as consultants, documentation, and entering orders.

## 2020-12-04 NOTE — PROGRESS NOTES
INFECTIOUS DISEASE CONSULT/INITIAL HOSPITAL VISIT    Whitney North  2000  [id number]    Date of Consult: 12/4/2020    Admission Date: 11/30/2020      Requesting Provider: No ref. provider found  Evaluating Physician: Alexei Woodson MD    Reason for Consultation: Fevers    History of present illness:    Patient is a 20 y.o. female presents the emergency room with fevers, chills, myalgias.  Patient allegedly had Covid in August 2020.  Patient been feeling ill for the last 7 days and reports flulike symptoms, fevers, chills, myalgias.  For the last several days developed nausea and vomiting.  Patient midst to right upper quadrant abdominal pain.    Patient has mild cough, shortness of breath, nasal congestion.  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  Patient wears PPE but is around patients with Covid regularly.    Felt ill 1 week ago.  Denies sore throat, admits to swollen lymph nodes in sides of neck;    Has left sided abdominal pain.  Says that when she had Covid in August she had anosmia she denies anosmia currently.    She feels worsened in August    When she has fever she feels profoundly weak and debilitated.    12/2/2020 patient has had improvement in fevers is on Motrin and Tylenol intermittently reports very severe headaches the worst of her life.  Patient is a accurate historian does report some photophobia does report some neck tenderness is able to flex her neck when asked.  Patient had MRCP earlier today to look for biliary ductal obstruction.    Patient's mother is by bedside today reports that she has severe case of mononucleosis requiring hospitalization when she was a teenager.    12/3/20; EBV IgM is positive; fevers are better; headache controled yesterday but coming back today; fevers better; no rash, sore throat; feels better    12/4/20 patient had fever last night feels better altogether has less headache today.  Patient has a cough but really no other complaints  is not sleeping well in the hospital bed wants to go home    Past Medical History:   Diagnosis Date   • Acne    • Dysmenorrhea 2016   • Irregular menses 2016       Past Surgical History:   Procedure Laterality Date   • NO PAST SURGERIES         Family History   Problem Relation Age of Onset   • Osteoporosis Paternal Grandmother    • Hypertension Maternal Grandmother    • Heart disease Maternal Grandmother    • Breast cancer Other        Social History     Socioeconomic History   • Marital status: Single     Spouse name: Not on file   • Number of children: 0   • Years of education: Not on file   • Highest education level: Not on file   Occupational History   • Occupation: STUDENT     Comment: [employer]   Tobacco Use   • Smoking status: Never Smoker   • Smokeless tobacco: Never Used   Substance and Sexual Activity   • Alcohol use: No   • Drug use: No   • Sexual activity: Not Currently     Partners: Male     Comment: LAST SEXUAL CONTACT IN MAY       No Known Allergies      Medication:    Current Facility-Administered Medications:   •  acetaminophen (TYLENOL) tablet 650 mg, 650 mg, Oral, Q4H PRN, 650 mg at 12/04/20 0528 **OR** acetaminophen (TYLENOL) 160 MG/5ML solution 650 mg, 650 mg, Oral, Q4H PRN **OR** acetaminophen (TYLENOL) suppository 650 mg, 650 mg, Rectal, Q4H PRN, Miguel Chaudhari, DO  •  benzonatate (TESSALON) capsule 100 mg, 100 mg, Oral, TID PRN, Mimi More, APRN, 100 mg at 12/04/20 0529  •  butalbital-acetaminophen-caffeine (FIORICET, ESGIC) -40 MG per tablet 2 tablet, 2 tablet, Oral, Q4H PRN, Mary Avila MD, 2 tablet at 12/03/20 2007  •  ibuprofen (ADVIL,MOTRIN) tablet 600 mg, 600 mg, Oral, Q4H PRN, Miguel Chaudhari, DO, 600 mg at 12/02/20 1341  •  ketorolac (TORADOL) injection 30 mg, 30 mg, Intravenous, Q6H PRN, Mary Avila MD, 30 mg at 12/03/20 2005  •  Magnesium Sulfate 2 gram Bolus, followed by 8 gram infusion (total Mg dose 10 grams)- Mg less than or equal to 1mg/dL,  2 g, Intravenous, PRN **OR** Magnesium Sulfate 2 gram / 50mL Infusion (GIVE X 3 BAGS TO EQUAL 6GM TOTAL DOSE) - Mg 1.1 - 1.5 mg/dl, 2 g, Intravenous, PRN **OR** Magnesium Sulfate 4 gram infusion- Mg 1.6-1.9 mg/dL, 4 g, Intravenous, PRN, Marquis Gaitan MD, Stopped at 20 1350  •  ondansetron (ZOFRAN) injection 4 mg, 4 mg, Intravenous, Q6H PRN, Miguel Chaudhari, DO, 4 mg at 20 2350  •  potassium chloride (MICRO-K) CR capsule 40 mEq, 40 mEq, Oral, PRN, 40 mEq at 20 1751 **OR** potassium chloride (KLOR-CON) packet 40 mEq, 40 mEq, Oral, PRN **OR** potassium chloride 10 mEq in 100 mL IVPB, 10 mEq, Intravenous, Q1H PRN, Marquis Gaitan MD  •  sodium chloride 0.9 % flush 10 mL, 10 mL, Intravenous, PRN, Miguel Chaudhari, DO, 10 mL at 20 1919  •  sodium chloride 0.9 % flush 10 mL, 10 mL, Intravenous, Q12H, Miguel Chaudhair, , 10 mL at 20 2100  •  sodium chloride 0.9 % flush 10 mL, 10 mL, Intravenous, PRN, Miguel Chaudhari, DO, 10 mL at 20 0749    Antibiotics:  Anti-Infectives (From admission, onward)    Ordered     Dose/Rate Route Frequency Start Stop    20  vancomycin 2250 mg/500 mL 0.9% NS IVPB ([facility])     Ordering Provider: Miguel Chaudhari DO    25 mg/kg × 88.5 kg  over 120 Minutes Intravenous Once 20  piperacillin-tazobactam (ZOSYN) 3.375 g in iso-osmotic dextrose 50 ml (premix)     Ordering Provider: Miguel Chaudhari DO    3.375 g  over 30 Minutes Intravenous Once 20            Review of Systems:  HPI    Physical Exam:   Vital Signs  Temp (24hrs), Av.2 °F (37.3 °C), Min:97.6 °F (36.4 °C), Max:102.4 °F (39.1 °C)    Temp  Min: 97.6 °F (36.4 °C)  Max: 102.4 °F (39.1 °C)  BP  Min: 121/66  Max: 141/74  Pulse  Min: 76  Max: 103  Resp  Min: 15  Max: 18  SpO2  Min: 99 %  Max: 99 %    GENERAL: Awake and alert, sitting in chair no distress Lucid  HEENT: Normocephalic, atraumatic.  PERRL. EOMI. No conjunctival injection.  No icterus.       LUNGS: no distress, symmetrical expansion  ABDOMEN:  nondistended.    EXT:  No cyanosis, clubbing or edema. No cord.  :  Without Anne catheter.  MSK: No joint deformity  SKIN: Warm and dry without cutaneous eruptions on Inspection/palpation.    NEURO: Oriented to PPT.  PSYCHIATRIC: Normal insight and judgement. Cooperative with PE    Laboratory Data    Results from last 7 days   Lab Units 12/04/20  0513 12/03/20  0553 12/02/20  0536   WBC 10*3/mm3 7.79 6.37 5.02   HEMOGLOBIN g/dL 10.6* 10.0* 10.7*   HEMATOCRIT % 34.0 32.2* 33.0*   PLATELETS 10*3/mm3 172 131* 116*     Results from last 7 days   Lab Units 12/04/20  0513   SODIUM mmol/L 135*   POTASSIUM mmol/L 4.2   CHLORIDE mmol/L 103   CO2 mmol/L 24.0   BUN mg/dL 5*   CREATININE mg/dL 0.70   GLUCOSE mg/dL 88   CALCIUM mg/dL 8.5*     Results from last 7 days   Lab Units 12/04/20  0513   ALK PHOS U/L 405*   BILIRUBIN mg/dL 0.6   ALT (SGPT) U/L 177*   AST (SGOT) U/L 115*             Results from last 7 days   Lab Units 12/01/20  0215   LACTATE mmol/L 1.9             Estimated Creatinine Clearance: 141.3 mL/min (by C-G formula based on SCr of 0.7 mg/dL).      Microbiology:  No results found for: ACANTHNAEG, AFBCX, BPERTUSSISCX, BLOODCX  No results found for: BCIDPCR, CXREFLEX, CSFCX, CULTURETIS  No results found for: CULTURES, HSVCX, URCX  No results found for: EYECULTURE, GCCX, LABHSV  No results found for: LEGIONELLA, MRSACX, MUMPSCX, MYCOPLASCX  No results found for: NOCARDIACX, STOOLCX  No results found for: THROATCX, UNSTIMCULT, URINECX, CULTURE, VZVCULTUR  No results found for: VIRALCULTU, WOUNDCX        Radiology:  Imaging Results (Last 72 Hours)     Procedure Component Value Units Date/Time    MRI abdomen wo contrast mrcp [[id number]] Collected: 12/02/20 1215     Updated: 12/03/20 1807    Narrative:      EXAMINATION: MRI ABDOMEN WO CONTRAST MRCP - 12/02/2020     INDICATION: B34.9-Viral infection, unspecified; D72.825-Bandemia;  R74.8-Abnormal  levels of other serum enzymes. Abdominal pain.     TECHNIQUE: Multiplanar MRI of the abdomen without intravenous contrast  with MRCP sequencing        COMPARISON: CT abdomen and pelvis 11/30/2020, ultrasound 11/30/2020.     FINDINGS: Lung bases demonstrate atelectasis and reticular  opacifications without pleural effusion. Liver without focal lesion  specifically no T2 hyperintense focus of abnormality and no significant  signal dropout on opposed phase imaging. Gallbladder unremarkable  without filling defect. MRCP sequences including 3D space reconstruction  reveals grossly normal caliber and contour of the tapering common bile  duct measuring 5-6 mm at maximum dimension to the level of the ampulla,  indeterminate for evaluation of pancreas divisum due to motion and  overall technique. Pancreas without focal lesion. Spleen is mildly  enlarged at 14 cm in craniocaudal dimension. No ascites. No abnormal  findings of the proximal visualized GI tract. Adrenals without nodule.  Kidneys without hydronephrosis or hydroureter. No bulky retroperitoneal  or upper abdominal adenopathy. No aggressive bone marrow signal findings  or acute soft tissue body wall findings.       Impression:      1. Lung bases demonstrate opacifications somewhat reticular in opacity  left greater than right concerning for atelectasis versus airspace  disease without pleural effusion.     2. No focal liver lesion or abnormality of the biliary system with  motion degradation on imaging, however, no evidence for cholelithiasis  or biliary dilatation/irregularity. Previously identified cholelithiasis  on ultrasound not clearly evident on the current exam.     3. Mild splenomegaly at 14 cm craniocaudal dimension greater than  expected for normal without focal splenic lesion or perisplenic ascites.     DICTATED:   12/02/2020  EDITED/ls :   12/02/2020         This report was finalized on 12/3/2020 6:02 PM by Dr. Stan Darby.       CT Abdomen Pelvis With  Contrast [[id number]] Collected: 11/30/20 1820     Updated: 12/01/20 1304    Narrative:      EXAMINATION: CT ABDOMEN AND PELVIS W CONTRAST-      INDICATION: Left upper quadrant abdominal pain; fever; COVID-19  negative; vomiting today.      TECHNIQUE: CT abdomen and pelvis with intravenous contrast  administration.     The radiation dose reduction device was turned on for each scan per the  ALARA (As Low as Reasonably Achievable) protocol.     COMPARISON: None.     FINDINGS: Lung bases demonstrate atelectasis and/or scarring without  consolidation or effusion. Liver demonstrates homogeneity throughout  without focal liver lesion. Gallbladder is unremarkable. No biliary  dilatation. Pancreas is unremarkable. Spleen is enlarged to 14.5 cm of  indeterminate significance. Adrenals without distinct nodule. Kidneys  without hydronephrosis or hydroureter. No bulky retroperitoneal  adenopathy. Patent nonaneurysmal abdominal aorta. Celiac and SMA origins  are patent. GI tract evaluation without focal thickening or  disproportionate dilatation of bowel to suggest mechanical obstructive  process. Appendix visualized and unremarkable. No free fluid or  intraabdominal free air. Pelvic viscera demonstrates findings of likely  current menstruation with trace physiologically appropriate free fluid.  No suspicious adnexal lesion or loculated collection. No aggressive  osseous or soft tissue body wall findings.       Impression:      No mechanical obstructive process or loculated fluid  collection with appendix visualized and unremarkable. Redundancy of the  sigmoid colon without evidence for acute inflammation to suggest colitis  or diverticulitis. Current menstruation findings with physiologically  appropriate free fluid in the pelvic cul-de-sac.     D:  11/30/2020  E:  12/01/2020     This report was finalized on 12/1/2020 1:00 PM by Dr. Stan Darby.               Impression:   Infectious mononucleosis  Ongoing fevers  Leukopenia  resolved  Fever improving  Relatively low platelets improving  Atypical lymphocytosis  Transaminitis improving  Gallstone  Splenomegaly  Headache    PLAN/RECOMMENDATIONS:   Thank you for asking us to see Whitney JERRY North, I recommend the following:      Patient has infectious mononucleosis and will continue to have fevers for possibly several weeks.    Headache is now controlled    Patient is having improving liver function studies, white blood cell count, platelets.    Patient can be discharged home from my standpoint    Can follow-up patient in the office this Monday    I suspect patient will continue to have fevers and would not wait for these to resolve in the hospital that might lead to a prolonged admission.    Treatment for mononucleosis will be supportive with as needed Tylenol.    Patient is requesting a cough suppressive medicine will defer to hospitalist.    Case d/w family, hospitalist, [facility] office  F/u with me this Monday  Dp/cm time 20 minutes       Alexei Woodson MD  12/4/2020  08:14 EST

## 2020-12-04 NOTE — PLAN OF CARE
Goal Outcome Evaluation:  Plan of Care Reviewed With: patient  Progress: no change  Outcome Summary: VSS. Pt was febrile at beginning of shift. Sinus Tach-NSR throughout the night. RA. 1x complaint of pain. PRN effective. No other complaints at this time.

## 2020-12-05 LAB
BACTERIA SPEC AEROBE CULT: ABNORMAL
BACTERIA SPEC AEROBE CULT: NORMAL
BACTERIA SPEC AEROBE CULT: NORMAL
GRAM STN SPEC: ABNORMAL